# Patient Record
Sex: FEMALE | HISPANIC OR LATINO | Employment: PART TIME | URBAN - METROPOLITAN AREA
[De-identification: names, ages, dates, MRNs, and addresses within clinical notes are randomized per-mention and may not be internally consistent; named-entity substitution may affect disease eponyms.]

---

## 2019-06-06 ENCOUNTER — TELEPHONE (OUTPATIENT)
Dept: SCHEDULING | Facility: IMAGING CENTER | Age: 39
End: 2019-06-06

## 2019-07-26 ENCOUNTER — TELEPHONE (OUTPATIENT)
Dept: MEDICAL GROUP | Facility: LAB | Age: 39
End: 2019-07-26

## 2019-07-26 NOTE — TELEPHONE ENCOUNTER
NEW PATIENT VISIT PRE-VISIT PLANNING    1.  Epic[employer] Patient is checked in Patient Demographics? YES    2.  Immunizations were updated in Epic using WebIZ?: No WebIZ record           3.  Is this appointment scheduled as a Hospital Follow-Up? No    4.  Patient is due for the following Health Maintenance Topics:   Health Maintenance Due   Topic Date Due   • IMM DTaP/Tdap/Td Vaccine (1 - Tdap) 04/23/1998   • PAP SMEAR  04/23/2000   • MAMMOGRAM  04/23/2019           5. Orders for overdue Health Maintenance topics pended in Pre-Charting? N\A    6.  Reviewed/Updated the following with patient:   •   Preferred Pharmacy? NO       •   Preferred Lab? NO       •   Preferred Communication? NO       •   Allergies? NO       •   Medications? NO       •   Social History? NO       •   Family History (document living status of immediate family members and if + hx of cancer, diabetes, hypertension, hyperlipidemia, heart attack, stroke) NO    7.  Updated Care Team?       •   DME Company (gait device, O2, CPAP, etc.) NO       •   Other Specialists (eye doctor, derm, GYN, cardiology, endo, etc): NO    8.  Patient was informed to arrive 15 min prior to their   scheduled appointment and bring in their medication bottles.

## 2019-07-31 ENCOUNTER — HOSPITAL ENCOUNTER (OUTPATIENT)
Facility: MEDICAL CENTER | Age: 39
End: 2019-07-31
Attending: FAMILY MEDICINE
Payer: COMMERCIAL

## 2019-07-31 ENCOUNTER — OFFICE VISIT (OUTPATIENT)
Dept: MEDICAL GROUP | Facility: LAB | Age: 39
End: 2019-07-31
Payer: COMMERCIAL

## 2019-07-31 VITALS
RESPIRATION RATE: 12 BRPM | HEART RATE: 70 BPM | WEIGHT: 174 LBS | OXYGEN SATURATION: 96 % | BODY MASS INDEX: 28.99 KG/M2 | TEMPERATURE: 97.4 F | DIASTOLIC BLOOD PRESSURE: 62 MMHG | HEIGHT: 65 IN | SYSTOLIC BLOOD PRESSURE: 98 MMHG

## 2019-07-31 DIAGNOSIS — E78.5 DYSLIPIDEMIA: ICD-10-CM

## 2019-07-31 DIAGNOSIS — Z11.3 SCREENING FOR STD (SEXUALLY TRANSMITTED DISEASE): ICD-10-CM

## 2019-07-31 DIAGNOSIS — N93.0 BLEEDING AFTER INTERCOURSE: ICD-10-CM

## 2019-07-31 PROCEDURE — 87510 GARDNER VAG DNA DIR PROBE: CPT

## 2019-07-31 PROCEDURE — 87660 TRICHOMONAS VAGIN DIR PROBE: CPT

## 2019-07-31 PROCEDURE — 87480 CANDIDA DNA DIR PROBE: CPT

## 2019-07-31 PROCEDURE — 99214 OFFICE O/P EST MOD 30 MIN: CPT | Performed by: FAMILY MEDICINE

## 2019-07-31 RX ORDER — BISMUTH SUBCITRATE POTASSIUM, METRONIDAZOLE, TETRACYCLINE HYDROCHLORIDE 140; 125; 125 MG/1; MG/1; MG/1
CAPSULE ORAL
Refills: 0 | COMMUNITY
Start: 2019-06-01 | End: 2021-10-22

## 2019-07-31 ASSESSMENT — PATIENT HEALTH QUESTIONNAIRE - PHQ9: CLINICAL INTERPRETATION OF PHQ2 SCORE: 0

## 2019-07-31 NOTE — PROGRESS NOTES
Subjective:     CC: Est patient     HPI:   Valery presents today with    Bleeding after intercourse:  Patient states for the last 3 months she has had bleeding after intercourse.  She states it is new for her.  She denies any pain with intercourse.  She denies any discharge or foul orders.  She denies any history of STDs and any new exposures to STDs.  She denies any STDs for her partners.  She states she has no other abnormalities with the exception of spotting after intercourse.  She denies it being very rough or any changes with her intercourse pattern.    Dyslipidemia:  This is a chronic stable issue.  Patient has been evaluated for worsening lipid panels.  She states she tries to eat a good diet and exercise lifestyle.  She denies any chest pain or ischemia.  She would like a repeat lipid panel.    Past Medical History:   Diagnosis Date   • Chronic daily headache    • Dyslipidemia    • Elevated liver enzymes        Social History     Tobacco Use   • Smoking status: Never Smoker   • Smokeless tobacco: Never Used   Substance Use Topics   • Alcohol use: Yes     Comment: occasional   • Drug use: No       Current Outpatient Medications Ordered in Epic   Medication Sig Dispense Refill   • Multiple Vitamins-Minerals (MULTIVITAMIN ADULT PO) Take  by mouth.     • vitamin D, Ergocalciferol, (DRISDOL) 05075 UNIT CAPS capsule Take 1 Cap by mouth every 72 hours. 1 tab 2x/week 24 Cap 3   • lorazepam (ATIVAN) 0.5 MG TABS Take 0.5 mg by mouth 3 times a day as needed.     • PYLERA 140-125-125 MG per capsule TK 1 C PO QID  0   • ibuprofen (MOTRIN) 800 MG TABS Take 1 Tab by mouth every 8 hours as needed. (Patient not taking: Reported on 7/30/2019) 90 Each 1   • citalopram (CELEXA) 20 MG TABS Take 1 Tab by mouth every morning. (Patient not taking: Reported on 7/30/2019) 30 Each 1     No current [facility]-ordered facility-administered medications on file.        Allergies:  Omnicef    ROS:  Gen: no fevers/chill, no changes in  "weight  Eyes: no changes in vision  ENT: no sore throat, no hearing loss, no bloody nose  Pulm: no sob, no cough  CV: no chest pain, no palpitations  GI: no nausea/vomiting, no diarrhea  : no dysuria  MSk: no myalgias  Skin: no rash  Neuro: no headaches, no numbness/tingling  Heme/Lymph: no easy bruising      Objective:       Exam:  BP (!) 98/62 (BP Location: Left arm, Patient Position: Sitting, BP Cuff Size: Adult)   Pulse 70   Temp 36.3 °C (97.4 °F) (Temporal)   Resp 12   Ht 1.638 m (5' 4.5\")   Wt 78.9 kg (174 lb)   LMP 07/17/2019   SpO2 96%   BMI 29.41 kg/m²  Body mass index is 29.41 kg/m².    Gen: Alert and oriented, No apparent distress.  Neck: Neck is supple without lymphadenopathy.  Lungs: Normal effort, CTA bilaterally, no wheezes, rhonchi, or rales  CV: Regular rate and rhythm. No murmurs, rubs, or gallops.               Ext: No clubbing, cyanosis, edema.    Assessment & Plan:     39 y.o. female with the following -     1. Dyslipidemia  Continue active lifestyle and good diet and exercise.  Follow-up labs.  - HEMOGLOBIN A1C; Future  - CBC WITH DIFFERENTIAL; Future  - Comp Metabolic Panel; Future  - Lipid Profile; Future  - TSH WITH REFLEX TO FT4; Future    2. Screening for STD (sexually transmitted disease)  Ordered today  - T.PALLIDUM AB EIA; Future  - HIV AG/AB COMBO ASSAY SCREENING; Future  - Chlamydia/GC PCR Urine Or Swab; Future  - VAGINAL PATHOGENS DNA PANEL; Future    3. Bleeding after intercourse  Will evaluate for infective process, STD panel as well as BD affirm.  Possibly benign however given her age spotting and irregularity could be concerning.  If continues will consider pelvic exam and ultrasound.  Follow-up labs.  She will be following up for a Pap smear.        Please note that this dictation was created using voice recognition software. I have made every reasonable attempt to correct obvious errors, but I expect that there are errors of grammar and possibly content that I did not " discover before finalizing the note.

## 2019-07-31 NOTE — LETTER
[facility]  Graciela Jaramillo M.D.  [address]  [place] [address]  Fax: [phone]   Authorization for Release/Disclosure of   Protected Health Information   Name: VALERY MELARA : 1980 SSN: [id number]   Address: [address] [place] [address] [place] [address] Phone:    [phone] (home)    I authorize the entity listed below to release/disclose the PHI below to:   [facility]/Graciela Jaramillo M.D. and Graciela Jaramillo M.D.   Provider or Entity Name:  [facility]   Address   City, [facility], [facility]   Phone:  [phone]    Fax:     Reason for request: continuity of care   Information to be released:    [  ] LAST COLONOSCOPY,  including any PATH REPORT and follow-up  [  ] LAST FIT/COLOGUARD RESULT [  ] LAST DEXA  [  ] LAST MAMMOGRAM  [ X ] LAST PAP  [  ] LAST LABS [  ] RETINA EXAM REPORT  [  ] IMMUNIZATION RECORDS  [  ] Release all info      [  ] Check here and initial the line next to each item to release ALL health information INCLUDING  _____ Care and treatment for drug and / or alcohol abuse  _____ HIV testing, infection status, or AIDS  _____ Genetic Testing    DATES OF SERVICE OR TIME PERIOD TO BE DISCLOSED: _____________  I understand and acknowledge that:  * This Authorization may be revoked at any time by you in writing, except if your health information has already been used or disclosed.  * Your health information that will be used or disclosed as a result of you signing this authorization could be re-disclosed by the recipient. If this occurs, your re-disclosed health information may no longer be protected by State or Federal laws.  * You may refuse to sign this Authorization. Your refusal will not affect your ability to obtain treatment.  * This Authorization becomes effective upon signing and will  on (date) __________.      If no date is indicated, this Authorization will  one (1) year from the signature date.    Name: Valery Melara    Signature:   Date:     2019            PLEASE FAX REQUESTED RECORDS BACK TO: [phone]

## 2019-08-01 LAB
AMBIGUOUS DTTM AMBI4: NORMAL
CANDIDA DNA VAG QL PROBE+SIG AMP: NEGATIVE
G VAGINALIS DNA VAG QL PROBE+SIG AMP: NEGATIVE
SIGNIFICANT IND 70042: NORMAL
SITE SITE: NORMAL
SOURCE SOURCE: NORMAL
T VAGINALIS DNA VAG QL PROBE+SIG AMP: NEGATIVE

## 2019-10-10 ENCOUNTER — OFFICE VISIT (OUTPATIENT)
Dept: MEDICAL GROUP | Facility: LAB | Age: 39
End: 2019-10-10
Payer: COMMERCIAL

## 2019-10-10 ENCOUNTER — HOSPITAL ENCOUNTER (OUTPATIENT)
Facility: MEDICAL CENTER | Age: 39
End: 2019-10-10
Attending: FAMILY MEDICINE
Payer: COMMERCIAL

## 2019-10-10 VITALS
SYSTOLIC BLOOD PRESSURE: 100 MMHG | TEMPERATURE: 97.6 F | OXYGEN SATURATION: 97 % | BODY MASS INDEX: 29.46 KG/M2 | HEART RATE: 87 BPM | HEIGHT: 65 IN | WEIGHT: 176.8 LBS | DIASTOLIC BLOOD PRESSURE: 60 MMHG

## 2019-10-10 DIAGNOSIS — Z20.828 HERPES EXPOSURE: Primary | ICD-10-CM

## 2019-10-10 DIAGNOSIS — Z01.419 WELL WOMAN EXAM WITH ROUTINE GYNECOLOGICAL EXAM: ICD-10-CM

## 2019-10-10 DIAGNOSIS — N93.9 VAGINAL BLEEDING: ICD-10-CM

## 2019-10-10 DIAGNOSIS — Z12.39 BREAST CANCER SCREENING: ICD-10-CM

## 2019-10-10 PROCEDURE — 99396 PREV VISIT EST AGE 40-64: CPT | Performed by: FAMILY MEDICINE

## 2019-10-10 PROCEDURE — 87624 HPV HI-RISK TYP POOLED RSLT: CPT

## 2019-10-10 PROCEDURE — 88175 CYTOPATH C/V AUTO FLUID REDO: CPT

## 2019-10-10 NOTE — PROGRESS NOTES
Subjective:     CC:   Chief Complaint   Patient presents with   • Gynecologic Exam       HPI:   Valery Haney is a 39 y.o. female who presents for annual exam. She is feeling well and denies any complaints.    Patient has GYN provider: no  Last pap: 3 years ago, wnl  Last mammo: Due  Last colonoscopy: Age 49  Last bone density test: Age 64  Qualify for hep C screen: No  Last Tdap: Done  Gardiasil: Aged out  Hx. STD's: No  Birth control: No    Menses every month with 28-31 days moderate bleeding previously, now ever 3 weeks  Cramping is moderate.   She do not take OTC analgesics for cramps.  No significant bloating/fluid retention, pelvic pain, or dyspareunia. No vaginal discharge, BLEEDING WITH INTERCOURSE AND BURNING   No breast tenderness, mass, nipple discharge, changes in size or contour, or abnormal cyclic discomfort.  She does perform regular self breast exams.  Regular exercise: no   Diet: balanced diet     She  has a past medical history of Chronic daily headache, Dyslipidemia, and Elevated liver enzymes.  She  has a past surgical history that includes other and other.    Family History   Problem Relation Age of Onset   • Cancer Mother         stomach   • Diabetes Mother    • Hypertension Mother    • Diabetes Father    • Hypertension Father    • Hyperlipidemia Father    • Other Brother         seizure   • Hypertension Brother        Social History     Socioeconomic History   • Marital status:      Spouse name: Not on file   • Number of children: Not on file   • Years of education: Not on file   • Highest education level: Not on file   Occupational History   • Not on file   Social Needs   • Financial resource strain: Not on file   • Food insecurity:     Worry: Not on file     Inability: Not on file   • Transportation needs:     Medical: Not on file     Non-medical: Not on file   Tobacco Use   • Smoking status: Never Smoker   • Smokeless tobacco: Never Used   Substance and Sexual Activity   • Alcohol  use: Yes     Comment: occasional   • Drug use: No   • Sexual activity: Yes     Partners: Male   Lifestyle   • Physical activity:     Days per week: Not on file     Minutes per session: Not on file   • Stress: Not on file   Relationships   • Social connections:     Talks on phone: Not on file     Gets together: Not on file     Attends [employer] service: Not on file     Active member of club or organization: Not on file     Attends meetings of clubs or organizations: Not on file     Relationship status: Not on file   • Intimate partner violence:     Fear of current or ex partner: Not on file     Emotionally abused: Not on file     Physically abused: Not on file     Forced sexual activity: Not on file   Other Topics Concern   • Not on file   Social History Narrative   • Not on file       Patient Active Problem List    Diagnosis Date Noted   • Vitamin d deficiency 10/21/2011   • Chronic daily headache    • Dyslipidemia    • Elevated liver enzymes          Current Outpatient Medications   Medication Sig Dispense Refill   • Multiple Vitamins-Minerals (MULTIVITAMIN ADULT PO) Take  by mouth.     • vitamin D, Ergocalciferol, (DRISDOL) 18590 UNIT CAPS capsule Take 1 Cap by mouth every 72 hours. 1 tab 2x/week 24 Cap 3   • PYLERA 140-125-125 MG per capsule TK 1 C PO QID  0   • ibuprofen (MOTRIN) 800 MG TABS Take 1 Tab by mouth every 8 hours as needed. (Patient not taking: Reported on 7/30/2019) 90 Each 1   • lorazepam (ATIVAN) 0.5 MG TABS Take 0.5 mg by mouth 3 times a day as needed.     • citalopram (CELEXA) 20 MG TABS Take 1 Tab by mouth every morning. (Patient not taking: Reported on 7/30/2019) 30 Each 1     No current facility-administered medications for this visit.      Allergies   Allergen Reactions   • Omnicef Swelling     Swelling of lips and hands       Review of Systems   Constitutional: Negative for fever, chills and malaise/fatigue.   HENT: Negative for congestion.    Eyes: Negative for pain.   Respiratory:  "Negative for cough and shortness of breath.    Cardiovascular: Negative for leg swelling.   Gastrointestinal: Negative for nausea, vomiting, abdominal pain and diarrhea.   Genitourinary: Negative for dysuria and hematuria.   Skin: Negative for rash.   Neurological: Negative for dizziness, focal weakness and headaches.   Endo/Heme/Allergies: Does not bruise/bleed easily.   Psychiatric/Behavioral: Negative for depression.  The patient is not nervous/anxious.      Objective:     /60 (BP Location: Left arm, Patient Position: Sitting)   Pulse 87   Temp 36.4 °C (97.6 °F) (Temporal)   Ht 1.638 m (5' 4.5\")   Wt 80.2 kg (176 lb 12.8 oz)   SpO2 97%   BMI 29.88 kg/m²   Body mass index is 29.88 kg/m².  Wt Readings from Last 4 Encounters:   10/10/19 80.2 kg (176 lb 12.8 oz)   07/31/19 78.9 kg (174 lb)   10/21/11 75.8 kg (167 lb)   03/24/11 77.1 kg (170 lb)       Physical Exam:  Constitutional: Well-developed and well-nourished. Not diaphoretic. No distress.   Skin: Skin is warm and dry. No rash noted.  Head: Atraumatic without lesions.  Eyes: Conjunctivae and extraocular motions are normal. Pupils are equal, round, and reactive to light. No scleral icterus.   Ears:  External ears unremarkable. Tympanic membranes clear and intact.  Nose: Nares patent. Septum midline. Turbinates without erythema nor edema. No discharge.   Mouth/Throat: Dentition is good. Tongue normal. Oropharynx is clear and moist. Posterior pharynx without erythema or exudates.  Neck: Supple, trachea midline. Normal range of motion. No thyromegaly present. No lymphadenopathy--cervical or supraclavicular.  Cardiovascular: Regular rate and rhythm, S1 and S2 without murmur, rubs, or gallops.    Breast: Breasts examined seated and supine. No skin changes, peau d'orange or nipple retraction. No discharge. No axillary or supraclavicular adenopathy. No masses or nodularity palpable.   Abdomen: Soft, non tender, and without distention. Active bowel sounds in " all four quadrants. No rebound, guarding, masses or HSM.  :Perineum and external genitalia normal without rash. Vagina with normal and physiologic discharge. Cervix without visible lesions or discharge. Bimanual exam without adnexal masses or cervical motion tenderness.  Extremities: No cyanosis, clubbing, erythema, nor edema. Distal pulses intact and symmetric.   Musculoskeletal: All major joints AROM full in all directions without pain.  Neurological: Alert and oriented x 3  Psychiatric:  Behavior, mood, and affect are appropriate.    Assessment and Plan:     1. Well woman exam with routine gynecological exam  Normal physical exam, due for mammogram, up-to-date on vaccines.  - THINPREP PAP WITH HPV; Future    2. Breast cancer screening  Ordered today  - [id number]; Future    3. Herpes exposure  Would like herpes a screening as her  has a diagnosis of herpes 2  - HSV 1/2 IGG W/ TYPE SPECIFIC RFLX; Future    4. Vaginal bleeding  Patient has had a history of bleeding after intercourse.  She has noticed it now for greater than 3 months on last visit we did do a test for BD affirm, this was negative.  She did not complete the STD panel or other labs at this time.  Now she has had some irregularity in her periods.  I think given she is not quite perimenopausal we should do a pelvic ultrasound.  She denies any traumatic sexual experiences.  She states that the bleeding after intercourse is spotting and it is mucousy and bright red.  She denies any pain only burning.  Follow-up ultrasound and STD panel.  - US-PELVIC TRANSVAGINAL ONLY; Future      Labs per orders  Immunizations per orders  Patient counseled about skin care, diet, supplements, vitamins, safe sex and exercise.    Follow-up: Return for 20 Min.    Please note that this dictation was created using voice recognition software. I have made every reasonable attempt to correct obvious errors, but I expect that there are errors of grammar and  possibly content that I did not discover before finalizing the note.

## 2019-10-11 DIAGNOSIS — Z01.419 WELL WOMAN EXAM WITH ROUTINE GYNECOLOGICAL EXAM: ICD-10-CM

## 2019-10-11 LAB
CYTOLOGY REG CYTOL: NORMAL
HPV HR 12 DNA CVX QL NAA+PROBE: NEGATIVE
HPV16 DNA SPEC QL NAA+PROBE: NEGATIVE
HPV18 DNA SPEC QL NAA+PROBE: NEGATIVE
SPECIMEN SOURCE: NORMAL

## 2019-11-09 LAB — HBA1C MFR BLD: 5.3 % (ref 0–5.6)

## 2019-12-09 ENCOUNTER — OFFICE VISIT (OUTPATIENT)
Dept: MEDICAL GROUP | Facility: LAB | Age: 39
End: 2019-12-09
Payer: COMMERCIAL

## 2019-12-09 VITALS
WEIGHT: 179.6 LBS | DIASTOLIC BLOOD PRESSURE: 60 MMHG | BODY MASS INDEX: 29.92 KG/M2 | HEART RATE: 70 BPM | OXYGEN SATURATION: 97 % | TEMPERATURE: 97.6 F | HEIGHT: 65 IN | SYSTOLIC BLOOD PRESSURE: 108 MMHG

## 2019-12-09 DIAGNOSIS — R74.8 ELEVATED LIVER ENZYMES: ICD-10-CM

## 2019-12-09 DIAGNOSIS — N93.9 VAGINAL BLEEDING: ICD-10-CM

## 2019-12-09 DIAGNOSIS — E78.5 DYSLIPIDEMIA: ICD-10-CM

## 2019-12-09 PROCEDURE — 99214 OFFICE O/P EST MOD 30 MIN: CPT | Performed by: FAMILY MEDICINE

## 2019-12-09 NOTE — PROGRESS NOTES
Subjective:     CC: Follow-up labs    HPI:   Valery presents today with     Dyslipidemia:  This is a chronic unstable issue.  Patient has a history of dyslipidemia and she states from her previous labs that her lipid panel has improved.  She however has gained weight.  She denies any chest pains or signs of ischemia.    Elevated liver enzymes:  This is a chronic unstable issue.  Patient has a history of elevated liver enzymes.  She is not a heavy drinker or on any liver toxic medications.  This is happened in the past and she states that it is worsening.  She denies any right upper quadrant pain.  She would not like any further work-up at this time as she is struggling with money currently.    Vaginal bleeding:  This is a chronic unstable issue.  Patient complained of vaginal bleeding after intercourse.  BD affirm and STD testing negative.  Pelvic ultrasound showed a stable fibroid.  Offered GYN interventions however patient declined.    Past Medical History:   Diagnosis Date   • Chronic daily headache    • Dyslipidemia    • Elevated liver enzymes        Social History     Tobacco Use   • Smoking status: Never Smoker   • Smokeless tobacco: Never Used   Substance Use Topics   • Alcohol use: Yes     Comment: occasional   • Drug use: No       Current Outpatient Medications Ordered in Epic   Medication Sig Dispense Refill   • PYLERA 140-125-125 MG per capsule TK 1 C PO QID  0   • Multiple Vitamins-Minerals (MULTIVITAMIN ADULT PO) Take  by mouth.     • vitamin D, Ergocalciferol, (DRISDOL) 23007 UNIT CAPS capsule Take 1 Cap by mouth every 72 hours. 1 tab 2x/week 24 Cap 3   • lorazepam (ATIVAN) 0.5 MG TABS Take 0.5 mg by mouth 3 times a day as needed.     • ibuprofen (MOTRIN) 800 MG TABS Take 1 Tab by mouth every 8 hours as needed. (Patient not taking: Reported on 7/30/2019) 90 Each 1   • citalopram (CELEXA) 20 MG TABS Take 1 Tab by mouth every morning. (Patient not taking: Reported on 7/30/2019) 30 Each 1     No  "current [facility]-ordered facility-administered medications on file.        Allergies:  Omnicef      ROS:  Gen: no fevers/chill, no changes in weight  Eyes: no changes in vision  ENT: no sore throat, no hearing loss, no bloody nose  Pulm: no sob, no cough  CV: no chest pain, no palpitations  GI: no nausea/vomiting, no diarrhea  : no dysuria  MSk: no myalgias  Skin: no rash  Neuro: no headaches, no numbness/tingling  Heme/Lymph: no easy bruising      Objective:       Exam:  /60 (BP Location: Left arm, Patient Position: Sitting)   Pulse 70   Temp 36.4 °C (97.6 °F) (Temporal)   Ht 1.638 m (5' 4.5\")   Wt 81.5 kg (179 lb 9.6 oz)   SpO2 97%   BMI 30.35 kg/m²  Body mass index is 30.35 kg/m².    Gen: Alert and oriented, No apparent distress.  Neck: Neck is supple without lymphadenopathy.  Lungs: Normal effort, CTA bilaterally, no wheezes, rhonchi, or rales  CV: Regular rate and rhythm. No murmurs, rubs, or gallops.               Ext: No clubbing, cyanosis, edema.    Assessment & Plan:     39 y.o. female with the following -     1. Dyslipidemia  Would like to do lifestyle intervention to improve on her cholesterol panel.  ASCVD risk low.  Will continue monitor.  - Comp Metabolic Panel; Future  - Lipid Profile; Future    2. Elevated liver enzymes  Patient declines further work-up as this is her second labs done with liver enzyme elevation.  Will return after 3 months of lifestyle intervention.  - Comp Metabolic Panel; Future  - Lipid Profile; Future    3.  Vaginal bleeding  Patient declined gyn referral  for possible ablation, will think about it and contact us.    Please note that this dictation was created using voice recognition software. I have made every reasonable attempt to correct obvious errors, but I expect that there are errors of grammar and possibly content that I did not discover before finalizing the note.      "

## 2020-12-16 ENCOUNTER — NON-PROVIDER VISIT (OUTPATIENT)
Dept: MEDICAL GROUP | Facility: LAB | Age: 40
End: 2020-12-16
Payer: COMMERCIAL

## 2020-12-16 DIAGNOSIS — Z23 NEED FOR VACCINATION: ICD-10-CM

## 2020-12-16 PROCEDURE — 90686 IIV4 VACC NO PRSV 0.5 ML IM: CPT | Performed by: INTERNAL MEDICINE

## 2020-12-16 PROCEDURE — 90471 IMMUNIZATION ADMIN: CPT | Performed by: INTERNAL MEDICINE

## 2020-12-16 NOTE — PROGRESS NOTES
"Valery Haney is a 40 y.o. female here for a non-provider visit for:   FLU    Reason for immunization: Annual Flu Vaccine  Immunization records indicate need for vaccine: Yes, confirmed with Epic  Minimum interval has been met for this vaccine: Yes  ABN completed: Yes    Order and dose verified by: ar  VIS Dated   was given to patient: Yes  All IAC Questionnaire questions were answered \"No.\"    Patient tolerated injection and no adverse effects were observed or reported: Yes    Pt scheduled for next dose in series: Not Indicated   "

## 2020-12-17 ENCOUNTER — TELEPHONE (OUTPATIENT)
Dept: MEDICAL GROUP | Facility: LAB | Age: 40
End: 2020-12-17

## 2020-12-18 NOTE — TELEPHONE ENCOUNTER
Patient called and is requesting a copy of her Immunization record. Called and LM for patient to call back.

## 2021-01-25 ENCOUNTER — APPOINTMENT (OUTPATIENT)
Dept: MEDICAL GROUP | Facility: LAB | Age: 41
End: 2021-01-25
Payer: COMMERCIAL

## 2021-10-22 ENCOUNTER — OFFICE VISIT (OUTPATIENT)
Dept: MEDICAL GROUP | Facility: PHYSICIAN GROUP | Age: 41
End: 2021-10-22
Payer: COMMERCIAL

## 2021-10-22 ENCOUNTER — HOSPITAL ENCOUNTER (OUTPATIENT)
Dept: LAB | Facility: MEDICAL CENTER | Age: 41
End: 2021-10-22
Attending: FAMILY MEDICINE
Payer: COMMERCIAL

## 2021-10-22 ENCOUNTER — TELEPHONE (OUTPATIENT)
Dept: MEDICAL GROUP | Facility: PHYSICIAN GROUP | Age: 41
End: 2021-10-22

## 2021-10-22 VITALS
HEIGHT: 64 IN | SYSTOLIC BLOOD PRESSURE: 110 MMHG | OXYGEN SATURATION: 94 % | WEIGHT: 183 LBS | DIASTOLIC BLOOD PRESSURE: 72 MMHG | BODY MASS INDEX: 31.24 KG/M2 | HEART RATE: 67 BPM | TEMPERATURE: 96.9 F

## 2021-10-22 DIAGNOSIS — Z00.00 WELL WOMAN EXAM (NO GYNECOLOGICAL EXAM): ICD-10-CM

## 2021-10-22 DIAGNOSIS — R10.11 RUQ PAIN: ICD-10-CM

## 2021-10-22 LAB
ALBUMIN SERPL BCP-MCNC: 4.5 G/DL (ref 3.2–4.9)
ALBUMIN/GLOB SERPL: 1.6 G/DL
ALP SERPL-CCNC: 81 U/L (ref 30–99)
ALT SERPL-CCNC: 74 U/L (ref 2–50)
ANION GAP SERPL CALC-SCNC: 11 MMOL/L (ref 7–16)
AST SERPL-CCNC: 44 U/L (ref 12–45)
BILIRUB SERPL-MCNC: 0.4 MG/DL (ref 0.1–1.5)
BUN SERPL-MCNC: 10 MG/DL (ref 8–22)
CALCIUM SERPL-MCNC: 9.5 MG/DL (ref 8.5–10.5)
CHLORIDE SERPL-SCNC: 105 MMOL/L (ref 96–112)
CHOLEST SERPL-MCNC: 268 MG/DL (ref 100–199)
CO2 SERPL-SCNC: 23 MMOL/L (ref 20–33)
CREAT SERPL-MCNC: 0.77 MG/DL (ref 0.5–1.4)
ERYTHROCYTE [DISTWIDTH] IN BLOOD BY AUTOMATED COUNT: 44.8 FL (ref 35.9–50)
EST. AVERAGE GLUCOSE BLD GHB EST-MCNC: 108 MG/DL
FASTING STATUS PATIENT QL REPORTED: NORMAL
GLOBULIN SER CALC-MCNC: 2.8 G/DL (ref 1.9–3.5)
GLUCOSE SERPL-MCNC: 96 MG/DL (ref 65–99)
HBA1C MFR BLD: 5.4 % (ref 4–5.6)
HCT VFR BLD AUTO: 43.4 % (ref 37–47)
HDLC SERPL-MCNC: 54 MG/DL
HGB BLD-MCNC: 14.8 G/DL (ref 12–16)
LDLC SERPL CALC-MCNC: 173 MG/DL
MCH RBC QN AUTO: 31 PG (ref 27–33)
MCHC RBC AUTO-ENTMCNC: 34.1 G/DL (ref 33.6–35)
MCV RBC AUTO: 90.8 FL (ref 81.4–97.8)
PLATELET # BLD AUTO: 311 K/UL (ref 164–446)
PMV BLD AUTO: 10.9 FL (ref 9–12.9)
POTASSIUM SERPL-SCNC: 4.3 MMOL/L (ref 3.6–5.5)
PROT SERPL-MCNC: 7.3 G/DL (ref 6–8.2)
RBC # BLD AUTO: 4.78 M/UL (ref 4.2–5.4)
SODIUM SERPL-SCNC: 139 MMOL/L (ref 135–145)
TRIGL SERPL-MCNC: 206 MG/DL (ref 0–149)
WBC # BLD AUTO: 6.3 K/UL (ref 4.8–10.8)

## 2021-10-22 PROCEDURE — 80053 COMPREHEN METABOLIC PANEL: CPT

## 2021-10-22 PROCEDURE — 85027 COMPLETE CBC AUTOMATED: CPT

## 2021-10-22 PROCEDURE — 36415 COLL VENOUS BLD VENIPUNCTURE: CPT

## 2021-10-22 PROCEDURE — 99214 OFFICE O/P EST MOD 30 MIN: CPT | Performed by: FAMILY MEDICINE

## 2021-10-22 PROCEDURE — 80061 LIPID PANEL: CPT

## 2021-10-22 PROCEDURE — 83036 HEMOGLOBIN GLYCOSYLATED A1C: CPT

## 2021-10-22 NOTE — PROGRESS NOTES
"Subjective:     Chief Complaint   Patient presents with   • Establish Care   • GI Problem     RLQ, discomfort, x10mo       HPI:   Valery presents today to discuss the following.    RUQ pain  Chronic issue  Has had RUQ pain over the past 10 mo  Usually within 10min of eating  Maybe after eating something greasy could make it worse  No hx of abd surgeries   She does have a hx of fatty liver      Past Medical History:   Diagnosis Date   • Chronic daily headache    • Dyslipidemia    • Elevated liver enzymes        No current Epic-ordered outpatient medications on file.     No current [facility]-ordered facility-administered medications on file.       Allergies:  Omnicef    Health Maintenance: Completed    ROS:  Gen: no fevers/chills, no changes in weight  Eyes: no changes in vision  Pulm: no sob, no cough  CV: no chest pain, no palpitations  GI: no nausea/vomiting, no diarrhea      Objective:     Exam:  /72 (BP Location: Right arm, Patient Position: Sitting, BP Cuff Size: Adult)   Pulse 67   Temp 36.1 °C (96.9 °F) (Temporal)   Ht 1.626 m (5' 4\")   Wt 83 kg (183 lb)   SpO2 94%   BMI 31.41 kg/m²  Body mass index is 31.41 kg/m².        Constitutional: Alert, no distress, well-groomed.  Skin: Warm, dry, good turgor, no rashes in visible areas.  Eye: Equal, round and reactive, conjunctiva clear, lids normal.  ENMT: Lips without lesions, good dentition, moist mucous membranes.  Neck: Trachea midline, no masses, no thyromegaly.  Respiratory: Unlabored respiratory effort, no cough.  ABD: Right upper quadrant tenderness to palpation.  There is no Wilburn sign present.  MSK: Normal gait, moves all extremities.  Neuro: Grossly non-focal.   Psych: Alert and oriented x3, normal affect and mood.        Assessment & Plan:     41 y.o. female with the following -     1. RUQ pain  This is a chronic condition.  The patient has had persistent right upper quadrant pain over the past 10 months especially after eating meals.  Physical " exam is concerning for gallbladder etiology.  I would like to establish baseline labs today along with an ultrasound of the right upper quadrant for further assessment and evaluation.  - Comp Metabolic Panel; Future  - US-RUQ; Future    2. Well woman exam (no gynecological exam)  - CBC WITHOUT DIFFERENTIAL; Future  - HEMOGLOBIN A1C; Future  - Lipid Profile; Future      Return in about 3 months (around 1/22/2022).    Please note that this dictation was created using voice recognition software. I have made every reasonable attempt to correct obvious errors, but I expect that there are errors of grammar and possibly content that I did not discover before finalizing the note.

## 2021-10-22 NOTE — ASSESSMENT & PLAN NOTE
Chronic issue  Has had RUQ pain over the past 10 mo  Usually within 10min of eating  Maybe after eating something greasy could make it worse  No hx of abd surgeries   She does have a hx of fatty liver

## 2021-10-23 NOTE — TELEPHONE ENCOUNTER
----- Message from Chico Martinez M.D. sent at 10/22/2021  2:30 PM PDT -----  Let patient know in Croatian  Does not have prediabetes or diabetes  No anemia  Please make sure that she was fasting during this test.  Cholesterol values are elevated  One of her liver enzymes is mildly elevated as well.  Again ensure that she was fasting.  We may have to repeat this test

## 2021-10-23 NOTE — TELEPHONE ENCOUNTER
Phone Number Called: [phone]    Call outcome: Spoke to patient regarding message below.    Message: Spoke to the patient regarding the following results.  Does not have prediabetes or diabetes  No anemia  Please make sure that she was fasting during this test.  Cholesterol values are elevated  One of her liver enzymes is mildly elevated as well.  Again ensure that she was fasting.  We may have to repeat this test    Patient acknowledged, and states she was fasting at the time she did her labs.    Please Advise.

## 2022-08-08 ENCOUNTER — OFFICE VISIT (OUTPATIENT)
Dept: MEDICAL GROUP | Facility: MEDICAL CENTER | Age: 42
End: 2022-08-08
Payer: COMMERCIAL

## 2022-08-08 VITALS
DIASTOLIC BLOOD PRESSURE: 74 MMHG | OXYGEN SATURATION: 99 % | BODY MASS INDEX: 28.98 KG/M2 | HEIGHT: 64 IN | SYSTOLIC BLOOD PRESSURE: 124 MMHG | RESPIRATION RATE: 16 BRPM | TEMPERATURE: 97.3 F | HEART RATE: 95 BPM | WEIGHT: 169.75 LBS

## 2022-08-08 DIAGNOSIS — Z13.220 LIPID SCREENING: ICD-10-CM

## 2022-08-08 DIAGNOSIS — Z83.3 FAMILY HISTORY OF DIABETES MELLITUS: ICD-10-CM

## 2022-08-08 DIAGNOSIS — Z12.31 ENCOUNTER FOR SCREENING MAMMOGRAM FOR BREAST CANCER: ICD-10-CM

## 2022-08-08 DIAGNOSIS — R10.9 STOMACH PAIN: ICD-10-CM

## 2022-08-08 DIAGNOSIS — F32.A ANXIETY AND DEPRESSION: ICD-10-CM

## 2022-08-08 DIAGNOSIS — R74.8 ELEVATED LIVER ENZYMES: ICD-10-CM

## 2022-08-08 DIAGNOSIS — K92.1 BLOOD IN THE STOOL: ICD-10-CM

## 2022-08-08 DIAGNOSIS — Z00.00 PREVENTATIVE HEALTH CARE: ICD-10-CM

## 2022-08-08 DIAGNOSIS — F41.9 ANXIETY AND DEPRESSION: ICD-10-CM

## 2022-08-08 DIAGNOSIS — Z13.29 THYROID DISORDER SCREEN: ICD-10-CM

## 2022-08-08 PROCEDURE — 99204 OFFICE O/P NEW MOD 45 MIN: CPT | Performed by: INTERNAL MEDICINE

## 2022-08-08 RX ORDER — VENLAFAXINE HYDROCHLORIDE 37.5 MG/1
37.5 CAPSULE, EXTENDED RELEASE ORAL DAILY
Qty: 30 CAPSULE | Refills: 3 | Status: SHIPPED | OUTPATIENT
Start: 2022-08-08 | End: 2023-06-07

## 2022-08-08 RX ORDER — HYDROXYZINE HYDROCHLORIDE 25 MG/1
25 TABLET, FILM COATED ORAL 3 TIMES DAILY PRN
Qty: 30 TABLET | Refills: 0 | Status: SHIPPED | OUTPATIENT
Start: 2022-08-08 | End: 2023-06-07

## 2022-08-08 ASSESSMENT — ANXIETY QUESTIONNAIRES
4. TROUBLE RELAXING: NEARLY EVERY DAY
3. WORRYING TOO MUCH ABOUT DIFFERENT THINGS: NEARLY EVERY DAY
2. NOT BEING ABLE TO STOP OR CONTROL WORRYING: NEARLY EVERY DAY
7. FEELING AFRAID AS IF SOMETHING AWFUL MIGHT HAPPEN: SEVERAL DAYS
5. BEING SO RESTLESS THAT IT IS HARD TO SIT STILL: NEARLY EVERY DAY
1. FEELING NERVOUS, ANXIOUS, OR ON EDGE: NEARLY EVERY DAY
IF YOU CHECKED OFF ANY PROBLEMS ON THIS QUESTIONNAIRE, HOW DIFFICULT HAVE THESE PROBLEMS MADE IT FOR YOU TO DO YOUR WORK, TAKE CARE OF THINGS AT HOME, OR GET ALONG WITH OTHER PEOPLE: VERY DIFFICULT
GAD7 TOTAL SCORE: 19
6. BECOMING EASILY ANNOYED OR IRRITABLE: NEARLY EVERY DAY

## 2022-08-08 ASSESSMENT — ENCOUNTER SYMPTOMS
FEVER: 0
CHILLS: 0
COUGH: 0
ABDOMINAL PAIN: 1
NAUSEA: 1
SHORTNESS OF BREATH: 0
DEPRESSION: 1
SORE THROAT: 0
NERVOUS/ANXIOUS: 1
INSOMNIA: 1

## 2022-08-08 ASSESSMENT — FIBROSIS 4 INDEX: FIB4 SCORE: 0.71

## 2022-08-08 ASSESSMENT — PATIENT HEALTH QUESTIONNAIRE - PHQ9
SUM OF ALL RESPONSES TO PHQ QUESTIONS 1-9: 24
CLINICAL INTERPRETATION OF PHQ2 SCORE: 5
5. POOR APPETITE OR OVEREATING: 3 - NEARLY EVERY DAY

## 2022-08-08 NOTE — ASSESSMENT & PLAN NOTE
This is an ongoing problem, patient is working with psychotherapy, which is somewhat helpful.  She is interested in pharmacotherapy.   - Start venlafaxine XR 37.5 mg tablet daily   - We will titrate up as needed.   -Hydroxyzine as needed for anxiety and sleep   - Continue psychotherapy   - Consider referral to behavioral health.

## 2022-08-08 NOTE — PATIENT INSTRUCTIONS
National Suicide Prevention Hotline: 1-831.228.3322 or just fernando 911.      Crisis Text Line: Text Hello to 911305

## 2022-08-08 NOTE — ASSESSMENT & PLAN NOTE
Patient has  had elevated ALT levels in October 2021.  Has not been followed by repeat CMP or right upper quadrant ultrasound since then.  She was evaluated by GI consultants ongoing stomach issues in the past.    - Repeat CMP   - Obtain right upper quadrant ultrasound

## 2022-08-08 NOTE — LETTER
[facility]  Juanita Steiner M.D.  [address]  [place] [address]  Fax: [phone]   Authorization for Release/Disclosure of   Protected Health Information   Name: MALORIE YANEZ : 1980 SSN: [id number]   Address: [address] [place] [address] Phone:    [phone] (home)    I authorize the entity listed below to release/disclose the PHI below to:   [facility]/Juanita Steiner M.D. and Juanita Steiner M.D.   Provider or Entity Name:  [facility]   Address   City, State, [facility]   Phone:      Fax:     Reason for request: continuity of care   Information to be released:    [  ] LAST COLONOSCOPY,  including any PATH REPORT and follow-up  [  ] LAST FIT/COLOGUARD RESULT [  ] LAST DEXA  [  ] LAST MAMMOGRAM  [  ] LAST PAP  [  ] LAST LABS [  ] RETINA EXAM REPORT  [  ] IMMUNIZATION RECORDS  [ x ] Release all info      [  ] Check here and initial the line next to each item to release ALL health information INCLUDING  _____ Care and treatment for drug and / or alcohol abuse  _____ HIV testing, infection status, or AIDS  _____ Genetic Testing    DATES OF SERVICE OR TIME PERIOD TO BE DISCLOSED: _____________  I understand and acknowledge that:  * This Authorization may be revoked at any time by you in writing, except if your health information has already been used or disclosed.  * Your health information that will be used or disclosed as a result of you signing this authorization could be re-disclosed by the recipient. If this occurs, your re-disclosed health information may no longer be protected by State or Federal laws.  * You may refuse to sign this Authorization. Your refusal will not affect your ability to obtain treatment.  * This Authorization becomes effective upon signing and will  on (date) __________.      If no date is indicated, this Authorization will  one (1) year from the signature date.    Name: Malorie Haney  Gianna    Signature:   Date:     8/8/2022       PLEASE FAX REQUESTED RECORDS BACK TO: [phone]

## 2022-08-08 NOTE — ASSESSMENT & PLAN NOTE
This is a chronic problem, patient was evaluated in the past by gastroenterology with upper endoscopy and H. pylori testing.  At that time results were negative.  She reports ongoing abdominal stomach discomfort, requesting H. pylori testing.    - Obtain H. pylori stool test   - Refer to gastroenterology    - At the end of appointment, she mentions that she has been seeing blood after wiping on the toilet paper

## 2022-08-08 NOTE — PROGRESS NOTES
Subjective:     Chief Complaint   Patient presents with   • Establish Care   • Depression   • Anxiety   • Requesting Labs      Diagnoses of Anxiety and depression, Elevated liver enzymes, Stomach pain, Preventative health care, Family history of diabetes mellitus, Lipid screening, Thyroid disorder screen, Encounter for screening mammogram for breast cancer, and Blood in the stool were pertinent to this visit.    HISTORY OF THE PRESENT ILLNESS: Patient is a 42 y.o. female. This pleasant patient is here today to establish care. Her prior PCP was Chico Martinez    Problem   Anxiety and Depression    This is an ongoing problem, patient is unfortunately going through divorce with her .  She is doing psychotherapy which she finds helpful.  PHQ-9 Screening 8/8/2022 7/31/2019   Little interest or pleasure in doing things 2 - more than half the days 0 - not at all   Feeling down, depressed, or hopeless 3 - nearly every day 0 - not at all   Trouble falling or staying asleep, or sleeping too much 3 - nearly every day -   Feeling tired or having little energy 3 - nearly every day -   Poor appetite or overeating 3 - nearly every day -   Feeling bad about yourself - or that you are a failure or have let yourself or your family down 3 - nearly every day -   Trouble concentrating on things, such as reading the newspaper or watching television 3 - nearly every day -   Moving or speaking so slowly that other people could have noticed. Or the opposite - being so fidgety or restless that you have been moving around a lot more than usual 3 - nearly every day -   Thoughts that you would be better off dead, or of hurting yourself in some way 1 - several days -   PHQ-2 Total Score 5 0   PHQ-9 Total Score 24 -       Interpretation of PHQ-9 Total Score   Score Severity   1-4 No Depression   5-9 Mild Depression   10-14 Moderate Depression   15-19 Moderately Severe Depression   20-27 Severe Depression    She did answer positively on  question about self-harm, however she denies active plan.  She is aware of suicide prevention.  She was treated medically if she develops an active plan.     Stomach Pain    This is a ongoing problem, patient reported diffuse abdominal pain.  She was evaluated by gastroenterology for this issue, she has undergone upper endoscopy and was tested for H. pylori.  She does report some nausea, associated with abdominal discomfort and is interested in H. pylori testing.     Elevated Liver Enzymes    Patient history of elevated liver enzymes, she was seen by her prior PCP in October and was referred for RUQ  ultrasound, which has not been completed.  She does have diffuse abdominal discomfort, however no clear right upper quadrant pain.  Lab Results   Component Value Date/Time    SODIUM 139 10/22/2021 09:29 AM    POTASSIUM 4.3 10/22/2021 09:29 AM    CHLORIDE 105 10/22/2021 09:29 AM    CO2 23 10/22/2021 09:29 AM    ANION 11.0 10/22/2021 09:29 AM    GLUCOSE 96 10/22/2021 09:29 AM    BUN 10 10/22/2021 09:29 AM    CREATININE 0.77 10/22/2021 09:29 AM    CREATININE 0.77 02/09/2011 12:00 AM    CALCIUM 9.5 10/22/2021 09:29 AM    ASTSGOT 44 10/22/2021 09:29 AM    ALTSGPT 74 (H) 10/22/2021 09:29 AM    TBILIRUBIN 0.4 10/22/2021 09:29 AM    ALBUMIN 4.5 10/22/2021 09:29 AM    TOTPROTEIN 7.3 10/22/2021 09:29 AM    GLOBULIN 2.8 10/22/2021 09:29 AM    AGRATIO 1.6 10/22/2021 09:29 AM            Past Medical History:   Diagnosis Date   • Chronic daily headache    • Dyslipidemia    • Elevated liver enzymes      Current Outpatient Medications Ordered in Epic   Medication Sig Dispense Refill   • venlafaxine XR (EFFEXOR XR) 37.5 MG CAPSULE SR 24 HR Take 1 Capsule by mouth every day. 30 Capsule 3   • hydrOXYzine HCl (ATARAX) 25 MG Tab Take 1 Tablet by mouth 3 times a day as needed for Anxiety. 30 Tablet 0     No current Epic-ordered facility-administered medications on file.     Health Maintenance: Patient never has had a mammogram, placed an  "order.  Patient to schedule.    Review of Systems   Constitutional: Negative for chills and fever.   HENT: Negative for sore throat.    Respiratory: Negative for cough and shortness of breath.    Cardiovascular: Negative for chest pain.   Gastrointestinal: Positive for abdominal pain and nausea. Negative for melena.   Psychiatric/Behavioral: Positive for depression. Negative for suicidal ideas. The patient is nervous/anxious and has insomnia.      Objective:     Exam: /74 (BP Location: Right arm, Patient Position: Sitting, BP Cuff Size: Adult)   Pulse 95   Temp 36.3 °C (97.3 °F) (Temporal)   Resp 16   Ht 1.626 m (5' 4\")   Wt 77 kg (169 lb 12.1 oz)   SpO2 99%  Body mass index is 29.14 kg/m².    Physical Exam  HENT:      Head: Normocephalic and atraumatic.      Nose: Nose normal.      Mouth/Throat:      Mouth: Mucous membranes are moist.   Cardiovascular:      Rate and Rhythm: Normal rate and regular rhythm.      Pulses: Normal pulses.      Heart sounds: Normal heart sounds.   Pulmonary:      Effort: Pulmonary effort is normal.      Breath sounds: Normal breath sounds.   Abdominal:      General: Bowel sounds are normal.      Palpations: Abdomen is soft.      Tenderness: There is generalized abdominal tenderness. There is no guarding or rebound.      Hernia: No hernia is present.   Neurological:      Mental Status: She is alert.       Labs: Reviewed results of CBC, CMP, Lipid panel from 10/22/2021.    Assessment & Plan:   42 y.o. female with the following -    Problem List Items Addressed This Visit     Anxiety and depression     This is an ongoing problem, patient is working with psychotherapy, which is somewhat helpful.  She is interested in pharmacotherapy.   - Start venlafaxine XR 37.5 mg tablet daily   - We will titrate up as needed.   -Hydroxyzine as needed for anxiety and sleep   - Continue psychotherapy   - Consider referral to behavioral health.           Relevant Medications    venlafaxine XR " (EFFEXOR XR) 37.5 MG CAPSULE SR 24 HR    hydrOXYzine HCl (ATARAX) 25 MG Tab    Elevated liver enzymes     Patient has  had elevated ALT levels in October 2021.  Has not been followed by repeat CMP or right upper quadrant ultrasound since then.  She was evaluated by [facility] consultants ongoing stomach issues in the past.    - Repeat CMP   - Obtain right upper quadrant ultrasound           Relevant Orders    US-RUQ    Comp Metabolic Panel    Stomach pain     This is a chronic problem, patient was evaluated in the past by gastroenterology with upper endoscopy and H. pylori testing.  At that time results were negative.  She reports ongoing abdominal stomach discomfort, requesting H. pylori testing.    - Obtain H. pylori stool test   - Refer to gastroenterology    - At the end of appointment, she mentions that she has been seeing blood after wiping on the toilet paper           Relevant Orders    H.PYLORI STOOL ANTIGEN      Other Visit Diagnoses     Preventative health care        Relevant Orders    CBC WITH DIFFERENTIAL    Family history of diabetes mellitus        Relevant Orders    HEMOGLOBIN A1C    Lipid screening        Relevant Orders    Lipid Profile    Thyroid disorder screen        Relevant Orders    TSH WITH REFLEX TO FT4    Encounter for screening mammogram for breast cancer        Relevant Orders    MA-SCREENING MAMMO BILAT W/CAD    Blood in the stool        Relevant Orders    Referral to Gastroenterology          Return in about 5 weeks (around 9/12/2022), or if symptoms worsen or fail to improve.    Please note that this dictation was created using voice recognition software. I have made every reasonable attempt to correct obvious errors, but I expect that there are errors of grammar and possibly content that I did not discover before finalizing the note.

## 2022-08-11 ENCOUNTER — HOSPITAL ENCOUNTER (OUTPATIENT)
Dept: RADIOLOGY | Facility: MEDICAL CENTER | Age: 42
End: 2022-08-11
Attending: INTERNAL MEDICINE
Payer: COMMERCIAL

## 2022-08-11 DIAGNOSIS — Z12.31 ENCOUNTER FOR SCREENING MAMMOGRAM FOR BREAST CANCER: ICD-10-CM

## 2022-08-11 PROCEDURE — 77063 BREAST TOMOSYNTHESIS BI: CPT

## 2022-08-15 ENCOUNTER — HOSPITAL ENCOUNTER (OUTPATIENT)
Dept: RADIOLOGY | Facility: MEDICAL CENTER | Age: 42
End: 2022-08-15
Attending: INTERNAL MEDICINE
Payer: COMMERCIAL

## 2022-08-15 DIAGNOSIS — R74.8 ELEVATED LIVER ENZYMES: ICD-10-CM

## 2022-08-15 PROCEDURE — 76705 ECHO EXAM OF ABDOMEN: CPT

## 2022-08-22 ENCOUNTER — HOSPITAL ENCOUNTER (OUTPATIENT)
Dept: LAB | Facility: MEDICAL CENTER | Age: 42
End: 2022-08-22
Attending: INTERNAL MEDICINE
Payer: COMMERCIAL

## 2022-08-22 DIAGNOSIS — R74.8 ELEVATED LIVER ENZYMES: ICD-10-CM

## 2022-08-22 DIAGNOSIS — Z00.00 PREVENTATIVE HEALTH CARE: ICD-10-CM

## 2022-08-22 DIAGNOSIS — Z86.39 HISTORY OF IRON DEFICIENCY: ICD-10-CM

## 2022-08-22 DIAGNOSIS — Z13.220 LIPID SCREENING: ICD-10-CM

## 2022-08-22 DIAGNOSIS — Z83.3 FAMILY HISTORY OF DIABETES MELLITUS: ICD-10-CM

## 2022-08-22 DIAGNOSIS — Z13.29 THYROID DISORDER SCREEN: ICD-10-CM

## 2022-08-22 LAB
ALBUMIN SERPL BCP-MCNC: 4.5 G/DL (ref 3.2–4.9)
ALBUMIN/GLOB SERPL: 1.6 G/DL
ALP SERPL-CCNC: 67 U/L (ref 30–99)
ALT SERPL-CCNC: 65 U/L (ref 2–50)
ANION GAP SERPL CALC-SCNC: 13 MMOL/L (ref 7–16)
AST SERPL-CCNC: 46 U/L (ref 12–45)
BASOPHILS # BLD AUTO: 0.2 % (ref 0–1.8)
BASOPHILS # BLD: 0.02 K/UL (ref 0–0.12)
BILIRUB SERPL-MCNC: 0.4 MG/DL (ref 0.1–1.5)
BUN SERPL-MCNC: 10 MG/DL (ref 8–22)
CALCIUM SERPL-MCNC: 9.1 MG/DL (ref 8.5–10.5)
CHLORIDE SERPL-SCNC: 104 MMOL/L (ref 96–112)
CHOLEST SERPL-MCNC: 236 MG/DL (ref 100–199)
CO2 SERPL-SCNC: 22 MMOL/L (ref 20–33)
CREAT SERPL-MCNC: 0.7 MG/DL (ref 0.5–1.4)
EOSINOPHIL # BLD AUTO: 0.06 K/UL (ref 0–0.51)
EOSINOPHIL NFR BLD: 0.7 % (ref 0–6.9)
ERYTHROCYTE [DISTWIDTH] IN BLOOD BY AUTOMATED COUNT: 42.6 FL (ref 35.9–50)
EST. AVERAGE GLUCOSE BLD GHB EST-MCNC: 108 MG/DL
FASTING STATUS PATIENT QL REPORTED: NORMAL
GFR SERPLBLD CREATININE-BSD FMLA CKD-EPI: 110 ML/MIN/1.73 M 2
GLOBULIN SER CALC-MCNC: 2.9 G/DL (ref 1.9–3.5)
GLUCOSE SERPL-MCNC: 108 MG/DL (ref 65–99)
HBA1C MFR BLD: 5.4 % (ref 4–5.6)
HCT VFR BLD AUTO: 38.2 % (ref 37–47)
HDLC SERPL-MCNC: 53 MG/DL
HGB BLD-MCNC: 12.2 G/DL (ref 12–16)
IMM GRANULOCYTES # BLD AUTO: 0.05 K/UL (ref 0–0.11)
IMM GRANULOCYTES NFR BLD AUTO: 0.6 % (ref 0–0.9)
IRON SATN MFR SERPL: 6 % (ref 15–55)
IRON SERPL-MCNC: 28 UG/DL (ref 40–170)
LDLC SERPL CALC-MCNC: 150 MG/DL
LYMPHOCYTES # BLD AUTO: 1.79 K/UL (ref 1–4.8)
LYMPHOCYTES NFR BLD: 19.9 % (ref 22–41)
MCH RBC QN AUTO: 27.7 PG (ref 27–33)
MCHC RBC AUTO-ENTMCNC: 31.9 G/DL (ref 33.6–35)
MCV RBC AUTO: 86.8 FL (ref 81.4–97.8)
MONOCYTES # BLD AUTO: 0.29 K/UL (ref 0–0.85)
MONOCYTES NFR BLD AUTO: 3.2 % (ref 0–13.4)
NEUTROPHILS # BLD AUTO: 6.77 K/UL (ref 2–7.15)
NEUTROPHILS NFR BLD: 75.4 % (ref 44–72)
NRBC # BLD AUTO: 0 K/UL
NRBC BLD-RTO: 0 /100 WBC
PLATELET # BLD AUTO: 365 K/UL (ref 164–446)
PMV BLD AUTO: 11.3 FL (ref 9–12.9)
POTASSIUM SERPL-SCNC: 3.8 MMOL/L (ref 3.6–5.5)
PROT SERPL-MCNC: 7.4 G/DL (ref 6–8.2)
RBC # BLD AUTO: 4.4 M/UL (ref 4.2–5.4)
SODIUM SERPL-SCNC: 139 MMOL/L (ref 135–145)
TIBC SERPL-MCNC: 475 UG/DL (ref 250–450)
TRIGL SERPL-MCNC: 166 MG/DL (ref 0–149)
TSH SERPL DL<=0.005 MIU/L-ACNC: 1.01 UIU/ML (ref 0.38–5.33)
UIBC SERPL-MCNC: 447 UG/DL (ref 110–370)
WBC # BLD AUTO: 9 K/UL (ref 4.8–10.8)

## 2022-08-22 PROCEDURE — 83550 IRON BINDING TEST: CPT

## 2022-08-22 PROCEDURE — 36415 COLL VENOUS BLD VENIPUNCTURE: CPT

## 2022-08-22 PROCEDURE — 85025 COMPLETE CBC W/AUTO DIFF WBC: CPT

## 2022-08-22 PROCEDURE — 83540 ASSAY OF IRON: CPT

## 2022-08-22 PROCEDURE — 80053 COMPREHEN METABOLIC PANEL: CPT

## 2022-08-22 PROCEDURE — 83036 HEMOGLOBIN GLYCOSYLATED A1C: CPT

## 2022-08-22 PROCEDURE — 84443 ASSAY THYROID STIM HORMONE: CPT

## 2022-08-22 PROCEDURE — 80061 LIPID PANEL: CPT

## 2022-08-26 ENCOUNTER — HOSPITAL ENCOUNTER (OUTPATIENT)
Facility: MEDICAL CENTER | Age: 42
End: 2022-08-26
Attending: INTERNAL MEDICINE
Payer: COMMERCIAL

## 2022-08-26 DIAGNOSIS — R10.9 STOMACH PAIN: ICD-10-CM

## 2022-08-26 LAB — H PYLORI AG STL QL IA: NOT DETECTED

## 2022-08-26 PROCEDURE — 87338 HPYLORI STOOL AG IA: CPT

## 2022-09-08 ENCOUNTER — OFFICE VISIT (OUTPATIENT)
Dept: MEDICAL GROUP | Facility: MEDICAL CENTER | Age: 42
End: 2022-09-08
Payer: COMMERCIAL

## 2022-09-08 VITALS — WEIGHT: 168.65 LBS | BODY MASS INDEX: 28.95 KG/M2

## 2022-09-08 DIAGNOSIS — E78.2 MIXED HYPERLIPIDEMIA: ICD-10-CM

## 2022-09-08 DIAGNOSIS — E78.5 HYPERLIPIDEMIA, UNSPECIFIED HYPERLIPIDEMIA TYPE: ICD-10-CM

## 2022-09-08 DIAGNOSIS — E61.1 IRON DEFICIENCY: ICD-10-CM

## 2022-09-08 DIAGNOSIS — D25.9 UTERINE LEIOMYOMA, UNSPECIFIED LOCATION: ICD-10-CM

## 2022-09-08 PROCEDURE — 99214 OFFICE O/P EST MOD 30 MIN: CPT | Performed by: INTERNAL MEDICINE

## 2022-09-08 RX ORDER — PRAVASTATIN SODIUM 20 MG
20 TABLET ORAL NIGHTLY
Qty: 30 TABLET | Refills: 11 | Status: SHIPPED | OUTPATIENT
Start: 2022-09-08 | End: 2023-06-07

## 2022-09-08 ASSESSMENT — ENCOUNTER SYMPTOMS
CHILLS: 0
SHORTNESS OF BREATH: 0
FEVER: 0
COUGH: 0
SORE THROAT: 0

## 2022-09-08 ASSESSMENT — FIBROSIS 4 INDEX: FIB4 SCORE: 0.67

## 2022-09-08 NOTE — ASSESSMENT & PLAN NOTE
Patient presents requesting referral to interventional radiology for uterine artery embolization.  Unfortunately, no records from her gynecology at this time.    - Request records and imaging from endocrinology   - Patient will attempt to upload results of the ultrasound via [employer]   - Refer to interventional radiology.

## 2022-09-08 NOTE — ASSESSMENT & PLAN NOTE
Patient is hesitant to take statin at this time, however she accepted prescription for pravastatin.   Advised on fish oil supplementation, healthful lifestyle measures, diet, exercise.    - Repeat lipid panel in 3 months.

## 2022-09-08 NOTE — LETTER
[facility]  Juanita Steiner M.D.  [address]  [place] [address]  Fax: [phone]   Authorization for Release/Disclosure of   Protected Health Information   Name: MARCE YANEZ : 1980 SSN: [id number]   Address: [address] [place] [address] Phone:    [phone] (home)    I authorize the entity listed below to release/disclose the PHI below to:   [facility]/Juanita Steiner M.D. and Juanita Steiner M.D.   Provider or Entity Name:  Dr. Minh Herrera  ([facility])   Address   City, State, [facility]   Phone:      Fax:     Reason for request: continuity of care   Information to be released:    [  ] LAST COLONOSCOPY,  including any PATH REPORT and follow-up  [  ] LAST FIT/COLOGUARD RESULT [  ] LAST DEXA  [  ] LAST MAMMOGRAM  [  ] LAST PAP  [  ] LAST LABS [  ] RETINA EXAM REPORT  [  ] IMMUNIZATION RECORDS  [x  ] Release all info      [  ] Check here and initial the line next to each item to release ALL health information INCLUDING  _____ Care and treatment for drug and / or alcohol abuse  _____ HIV testing, infection status, or AIDS  _____ Genetic Testing    DATES OF SERVICE OR TIME PERIOD TO BE DISCLOSED: _____________  I understand and acknowledge that:  * This Authorization may be revoked at any time by you in writing, except if your health information has already been used or disclosed.  * Your health information that will be used or disclosed as a result of you signing this authorization could be re-disclosed by the recipient. If this occurs, your re-disclosed health information may no longer be protected by State or Federal laws.  * You may refuse to sign this Authorization. Your refusal will not affect your ability to obtain treatment.  * This Authorization becomes effective upon signing and will  on (date) __________.      If no date is indicated, this Authorization will  one (1) year from the signature  date.    Name: Malorie Katz    Signature:   Date:     9/8/2022       PLEASE FAX REQUESTED RECORDS BACK TO: [phone]

## 2022-09-08 NOTE — PROGRESS NOTES
Subjective:     Diagnoses of Uterine leiomyoma, unspecified location, Hyperlipidemia, unspecified hyperlipidemia type, Iron deficiency, and Mixed hyperlipidemia were pertinent to this visit.    HPI: Malorie Rasmussen is a pleasant 42 y.o. female who presents today for follow up on Labs     Problem   Uterine Fibroid    This is a longstanding problem, I do not have any records at this time from her OB/GYN.  Per patient, she was advised by her OB/GYN to be referred to interventional radiology for uterine artery embolization.  Not sure, why for gynecologist was unable to refer her.      Iron Deficiency    Component      Latest Ref Rng & Units 8/22/2022          10:30 AM   Iron      40 - 170 ug/dL 28 (L)   Total Iron Binding      250 - 450 ug/dL 475 (H)   Unsat Iron Binding      110 - 370 ug/dL 447 (H)   % Saturation      15 - 55 % 6 (L)   Hemoglobin was normal limits, patient has longstanding history of fibroids and awaiting procedure.     Mixed Hyperlipidemia    This is a chronic problem, total cholesterol and LDL did improve after weight loss, however still significantly above normal limits.  In the light of fatty liver disease, cholesterol control is very important and this was emphasized to the patient.  Lab Results   Component Value Date/Time    CHOLSTRLTOT 236 (H) 08/22/2022 10:30 AM    TRIGLYCERIDE 166 (H) 08/22/2022 10:30 AM    HDL 53 08/22/2022 10:30 AM     (H) 08/22/2022 10:30 AM             Past Medical History:   Diagnosis Date    Chronic daily headache     Dyslipidemia     Elevated liver enzymes      Review of Systems   Constitutional:  Negative for chills and fever.   HENT:  Negative for sore throat.    Respiratory:  Negative for cough and shortness of breath.    Cardiovascular:  Negative for chest pain.   Psychiatric/Behavioral:  Negative for suicidal ideas.      Objective:     Exam:  BP (P) 106/64 (BP Location: Left arm, Patient Position: Sitting, BP Cuff Size: Adult)   Pulse (P) 71   Temp (P)  "36.6 °C (97.8 °F) (Temporal)   Ht (P) 1.626 m (5' 4\")   Wt 76.5 kg (168 lb 10.4 oz)   SpO2 (P) 100%   BMI (P) 28.95 kg/m²  Body mass index is 28.95 kg/m² (pended).    Physical Exam  Constitutional:       Appearance: Normal appearance.   HENT:      Head: Normocephalic and atraumatic.   Pulmonary:      Effort: Pulmonary effort is normal.   Neurological:      Mental Status: She is alert and oriented to person, place, and time.   Psychiatric:         Mood and Affect: Mood normal.     Labs: Reviewed and discussed results of CBC, CMP, hemoglobin A1c, lipid panel TSH from 8/22/2022    Assessment & Plan:   Malorie Rasmussen  is a pleasant 42 y.o. female with the following -     Problem List Items Addressed This Visit       Mixed hyperlipidemia (Chronic)     Patient is hesitant to take statin at this time, however she accepted prescription for pravastatin.   Advised on fish oil supplementation, healthful lifestyle measures, diet, exercise.    - Repeat lipid panel in 3 months.         Relevant Medications    pravastatin (PRAVACHOL) 20 MG Tab    Uterine fibroid (Chronic)     Patient presents requesting referral to interventional radiology for uterine artery embolization.  Unfortunately, no records from her gynecology at this time.    - Request records and imaging from endocrinology   - Patient will attempt to upload results of the ultrasound via [employer]   - Refer to interventional radiology.         Relevant Orders    Referral to Interventional Radiology    Iron deficiency     Advised on iron rich foods, ferrous sulfate 325 mg once daily with vitamin C or orange juice.          Other Visit Diagnoses       Hyperlipidemia, unspecified hyperlipidemia type        Relevant Medications    pravastatin (PRAVACHOL) 20 MG Tab    Other Relevant Orders    Lipid Profile          Return in about 3 months (around 12/8/2022), or if symptoms worsen or fail to improve, for lipidp panel .    Please note that this dictation was created using " voice recognition software. I have made every reasonable attempt to correct obvious errors, but I expect that there are errors of grammar and possibly content that I did not discover before finalizing the note.

## 2022-09-23 ENCOUNTER — HOSPITAL ENCOUNTER (OUTPATIENT)
Facility: MEDICAL CENTER | Age: 42
End: 2022-09-23
Attending: OBSTETRICS & GYNECOLOGY | Admitting: OBSTETRICS & GYNECOLOGY
Payer: COMMERCIAL

## 2022-09-29 ENCOUNTER — TELEPHONE (OUTPATIENT)
Dept: MEDICAL GROUP | Facility: MEDICAL CENTER | Age: 42
End: 2022-09-29
Payer: COMMERCIAL

## 2022-09-29 NOTE — TELEPHONE ENCOUNTER
"----- Message from Juanita Steiner M.D. sent at 9/27/2022 12:35 PM PDT -----  Regarding: Referral for uterine artery embolization  Janel Shah,     Please call and let patient know, that I have not heard back from interventional radiology.    \"Authorizations reached out to me about this Referral to Interventional Radiology for uterine artery embolization. Prior to scheduling this consult the patient will need to have an MRI Pelvis w-w/o and an Endometrial biopsy with in the  past 3 months. I don't see any current imaging on file. Will she be following up with a OB/GYN provider in the area? The  OB/GYN provider usually perform these biopsies.\"    Therefore I recommend her to follow-up with her OB/GYN specialist and they will have to order the tests listed above and they will have to refer her to an interventional radiologist when all these tests are completed.            "

## 2022-09-29 NOTE — TELEPHONE ENCOUNTER
Phone Number Called: [phone] (home)      Call outcome: Spoke to patient regarding message below.    Message: contacted pt informed of provider note. Pt verbalized understanding and had no questions

## 2022-11-01 ENCOUNTER — APPOINTMENT (OUTPATIENT)
Dept: RADIOLOGY | Facility: MEDICAL CENTER | Age: 42
End: 2022-11-01
Attending: OBSTETRICS & GYNECOLOGY
Payer: COMMERCIAL

## 2022-11-12 ENCOUNTER — HOSPITAL ENCOUNTER (OUTPATIENT)
Dept: RADIOLOGY | Facility: MEDICAL CENTER | Age: 42
End: 2022-11-12
Attending: OBSTETRICS & GYNECOLOGY
Payer: COMMERCIAL

## 2022-11-12 DIAGNOSIS — D25.9 UTERINE LEIOMYOMA, UNSPECIFIED LOCATION: ICD-10-CM

## 2022-11-12 PROCEDURE — A9576 INJ PROHANCE MULTIPACK: HCPCS

## 2022-11-12 PROCEDURE — 72197 MRI PELVIS W/O & W/DYE: CPT

## 2022-11-12 PROCEDURE — 700117 HCHG RX CONTRAST REV CODE 255

## 2022-11-12 RX ADMIN — GADOTERIDOL 15 ML: 279.3 INJECTION, SOLUTION INTRAVENOUS at 18:44

## 2022-11-28 NOTE — PROGRESS NOTES
Interventional Radiology Consultation      Re: Malorie Katz     MRN: [id number]   : 1980    Malorie Katz was referred by Juanita Steiner MD. She is a 42 y.o. female seen in clinic for evaluation and possible intervention of painful uterine fibroids. She is also under the care of Jojo Rodríguez MD.    History of Present Illness:  Ms.Ruiz Katz presents with painful uterine fibroids. She was first diagnosed about 4-5 years ago but was told they were small and no intervention was planned. Now, the dominant fibroid has grown to about 8 cm in size. She has a history of heavy menses for greater than a year. She has low back pain. She has pressure and cramping in her pelvis. She had a depo provera injection last month that has helped. Ms.Ruiz Katz is apprehensive about undergoing a hysterectomy and has been referred to interventional radiology for evaluation and management with locoregional therapy. Today, she reports menorrhagia, low back pain, and pelvic pain. She is not currently sexually active but reports dyspareunia. She is not looking at future family planning. She lives in [place] and is unaccompanied by a support person to today's consultation.    She is seen today for review of imaging studies and discussion of possible endovascular intervention for painful uterine fibroids.    Past Medical History:   Diagnosis Date    Chronic daily headache     Dyslipidemia     Elevated liver enzymes      Past Surgical History:   Procedure Laterality Date    ABDOMINOPLASTY  2014    OTHER      LASIK both eyes    OTHER      in grown toenail      Social History     Socioeconomic History    Marital status:      Spouse name: Not on file    Number of children: Not on file    Years of education: Not on file    Highest education level: Not on file   Occupational History    Not on file   Tobacco Use    Smoking status: Never    Smokeless tobacco: Never   Vaping Use    Vaping Use:  Never used   Substance and Sexual Activity    Alcohol use: Yes     Comment: Socially    Drug use: No    Sexual activity: Yes     Partners: Male   Other Topics Concern    Not on file   Social History Narrative    Not on file     Social Determinants of Health     Financial Resource Strain: Not on file   Food Insecurity: Not on file   Transportation Needs: Not on file   Physical Activity: Not on file   Stress: Not on file   Social Connections: Not on file   Intimate Partner Violence: Not on file   Housing Stability: Not on file     Family History   Problem Relation Age of Onset    Cancer Mother         stomach    Diabetes Mother     Hypertension Mother     Diabetes Father     Hypertension Father     Hyperlipidemia Father     Other Brother         seizure    Hypertension Brother        Review of Systems   Constitutional: Negative.  Negative for chills, diaphoresis, fever, malaise/fatigue and weight loss.   Respiratory: Negative.     Cardiovascular: Negative.    Gastrointestinal: Negative.    Genitourinary:  Positive for frequency.        Positive for heavy menses     Musculoskeletal:  Positive for back pain.   Skin: Negative.    Neurological:  Negative for sensory change, speech change, focal weakness and weakness.   Psychiatric/Behavioral:  Negative for substance abuse.      A comprehensive 14-point review of systems was negative except as described above.     Labs:    Latest Reference Range & Units 08/22/22 10:30   WBC 4.8 - 10.8 K/uL 9.0   RBC 4.20 - 5.40 M/uL 4.40   Hemoglobin 12.0 - 16.0 g/dL 12.2   Hematocrit 37.0 - 47.0 % 38.2   MCV 81.4 - 97.8 fL 86.8   MCH 27.0 - 33.0 pg 27.7   MCHC 33.6 - 35.0 g/dL 31.9 (L)   RDW 35.9 - 50.0 fL 42.6   Platelet Count 164 - 446 K/uL 365   MPV 9.0 - 12.9 fL 11.3   Neutrophils-Polys 44.00 - 72.00 % 75.40 (H)   Neutrophils (Absolute) 2.00 - 7.15 K/uL 6.77   Lymphocytes 22.00 - 41.00 % 19.90 (L)   Lymphs (Absolute) 1.00 - 4.80 K/uL 1.79   Monocytes 0.00 - 13.40 % 3.20   Monos  (Absolute) 0.00 - 0.85 K/uL 0.29   Eosinophils 0.00 - 6.90 % 0.70   Eos (Absolute) 0.00 - 0.51 K/uL 0.06   Basophils 0.00 - 1.80 % 0.20   Baso (Absolute) 0.00 - 0.12 K/uL 0.02   Immature Granulocytes 0.00 - 0.90 % 0.60   Immature Granulocytes (abs) 0.00 - 0.11 K/uL 0.05   Nucleated RBC /100 WBC 0.00   NRBC (Absolute) K/uL 0.00      [facility] Reference Range & Units 08/22/22 10:30   Sodium 135 - 145 mmol/L 139   Potassium 3.6 - 5.5 mmol/L 3.8   Chloride 96 - 112 mmol/L 104   Co2 20 - 33 mmol/L 22   Anion Gap 7.0 - 16.0  13.0   Glucose 65 - 99 mg/dL 108 (H)   Bun 8 - 22 mg/dL 10   Creatinine 0.50 - 1.40 mg/dL 0.70   GFR (CKD-EPI) >60 mL/min/1.73 m 2 110   Calcium 8.5 - 10.5 mg/dL 9.1   AST(SGOT) 12 - 45 U/L 46 (H)   ALT(SGPT) 2 - 50 U/L 65 (H)   Alkaline Phosphatase 30 - 99 U/L 67   Total Bilirubin 0.1 - 1.5 mg/dL 0.4   Albumin 3.2 - 4.9 g/dL 4.5   Total Protein 6.0 - 8.2 g/dL 7.4   Globulin 1.9 - 3.5 g/dL 2.9   A-G Ratio g/dL 1.6   Iron 40 - 170 ug/dL 28 (L)   Total Iron Binding 250 - 450 ug/dL 475 (H)   % Saturation 15 - 55 % 6 (L)   Unsat Iron Binding 110 - 370 ug/dL 447 (H)   Glycohemoglobin 4.0 - 5.6 % 5.4   Estim. Avg Glu mg/dL 108   Fasting Status  Fasting   Cholesterol,Tot 100 - 199 mg/dL 236 (H)   Triglycerides 0 - 149 mg/dL 166 (H)   HDL >=40 mg/dL 53   LDL <100 mg/dL 150 (H)   (H): Data is abnormally high  (L): Data is abnormally low    Pathology:  8/18/22:      Radiology:   MRI pelvis on 11/12/22 at [facility]:  1.  Probable small submucosal fibroid anteriorly on the RIGHT in the lower uterine segment.  2.  Large fibroid at the dorsal fundus measuring 8.3 cm.  3.  Smaller fibroids in the ventral fundus measuring up to 12 mm.      Current Outpatient Medications   Medication Sig Dispense Refill    pravastatin (PRAVACHOL) 20 MG Tab Take 1 Tablet by mouth every evening. 30 Tablet 11    venlafaxine XR (EFFEXOR XR) 37.5 MG CAPSULE SR 24 HR Take 1 Capsule by mouth every day. 30 Capsule 3    hydrOXYzine HCl (ATARAX) 25  MG Tab Take 1 Tablet by mouth 3 times a day as needed for Anxiety. 30 Tablet 0     No current facility-administered medications for this visit.       Allergies   Allergen Reactions    Omnicef Swelling     Swelling of lips and hands       Physical Exam  Constitutional:       General: She is not in acute distress.     Appearance: She is not diaphoretic.   HENT:      Head: Normocephalic.   Eyes:      General: No scleral icterus.  Pulmonary:      Effort: Pulmonary effort is normal. No respiratory distress.   Abdominal:      General: There is no distension.   Skin:     General: Skin is warm and dry.      Coloration: Skin is not pale.      Findings: No erythema or rash.   Neurological:      General: No focal deficit present.      Mental Status: She is alert and oriented to person, place, and time. She is not disoriented.      Cranial Nerves: No cranial nerve deficit or facial asymmetry.      Sensory: Sensation is intact.      Motor: No weakness or tremor.      Coordination: Coordination normal.      Gait: Gait is intact. Gait normal.   Psychiatric:         Mood and Affect: Mood and affect normal.         Behavior: Behavior normal.         Thought Content: Thought content normal.         Cognition and Memory: Memory normal.         Judgment: Judgment normal.     ECOG Performance Status 0    Impression:   1. Leiomyoma.   2. Abnormal uterine bleeding.  3. Hyperlipidemia.    Plan:   Dain Corona MD has reviewed 's history and imaging studies, examined the patient, and discussed treatment options.  is a candidate for transarterial embolization of painful uterine fibroids. We discussed the method of the procedure at length including angiography and embolization as well as the expected clinical course with the probability of post-embolization syndrome nausea and pain and hospitalization. We discussed the possibility of fibroid recurrence and development of future tumors. We additionally discussed the risks,  including bleeding and infection, damage to the arteries or adjacent tissue, reaction to any medications given during the procedure, potential side effects of contrast administration including renal damage, non-target embolization, premature ovarian failure, and death. There is a risk the procedure will not be effective in managing her symptoms to her satisfaction, specifically her bulk symptoms that include pelvic pressure and urinary frequency. We discussed alternatives to the procedure including surveillance with no intervention and she has already discussed medication management and myomectomy/ hysterectomy with her gynecologist. The patient verbalizes understanding of risks, benefits, and alternatives to IR intervention and will let us know if she elects to proceed with [place]. All questions were answered.     VAL Santana with Dain Corona MD  Interventional Radiology   [facility]   [id number]  [place] [address]  [phone]

## 2022-11-29 ENCOUNTER — HOSPITAL ENCOUNTER (OUTPATIENT)
Dept: RADIOLOGY | Facility: MEDICAL CENTER | Age: 42
End: 2022-11-29
Attending: INTERNAL MEDICINE
Payer: COMMERCIAL

## 2022-11-29 DIAGNOSIS — D25.9 UTERINE LEIOMYOMA, UNSPECIFIED LOCATION: ICD-10-CM

## 2022-11-29 ASSESSMENT — ENCOUNTER SYMPTOMS
GASTROINTESTINAL NEGATIVE: 1
CONSTITUTIONAL NEGATIVE: 1
SPEECH CHANGE: 0
FOCAL WEAKNESS: 0
DIAPHORESIS: 0
WEIGHT LOSS: 0
SENSORY CHANGE: 0
FEVER: 0
BACK PAIN: 1
RESPIRATORY NEGATIVE: 1
CHILLS: 0
WEAKNESS: 0
CARDIOVASCULAR NEGATIVE: 1

## 2022-11-29 ASSESSMENT — LIFESTYLE VARIABLES: SUBSTANCE_ABUSE: 0

## 2022-11-30 ENCOUNTER — APPOINTMENT (OUTPATIENT)
Dept: RADIOLOGY | Facility: MEDICAL CENTER | Age: 42
End: 2022-11-30
Attending: OBSTETRICS & GYNECOLOGY
Payer: COMMERCIAL

## 2022-12-12 ENCOUNTER — APPOINTMENT (OUTPATIENT)
Dept: MEDICAL GROUP | Facility: MEDICAL CENTER | Age: 42
End: 2022-12-12
Payer: COMMERCIAL

## 2022-12-23 ENCOUNTER — PRE-ADMISSION TESTING (OUTPATIENT)
Dept: ADMISSIONS | Facility: MEDICAL CENTER | Age: 42
DRG: 743 | End: 2022-12-23
Attending: OBSTETRICS & GYNECOLOGY
Payer: COMMERCIAL

## 2022-12-23 DIAGNOSIS — Z01.812 PRE-OPERATIVE LABORATORY EXAMINATION: ICD-10-CM

## 2022-12-23 DIAGNOSIS — Z01.810 PRE-OPERATIVE CARDIOVASCULAR EXAMINATION: ICD-10-CM

## 2022-12-23 LAB
ABO GROUP BLD: NORMAL
ANION GAP SERPL CALC-SCNC: 12 MMOL/L (ref 7–16)
APTT PPP: 30.4 SEC (ref 24.7–36)
B-HCG SERPL-ACNC: <1 MIU/ML (ref 0–5)
BASOPHILS # BLD AUTO: 0.3 % (ref 0–1.8)
BASOPHILS # BLD: 0.02 K/UL (ref 0–0.12)
BLD GP AB SCN SERPL QL: NORMAL
BUN SERPL-MCNC: 7 MG/DL (ref 8–22)
CALCIUM SERPL-MCNC: 9.4 MG/DL (ref 8.5–10.5)
CHLORIDE SERPL-SCNC: 103 MMOL/L (ref 96–112)
CO2 SERPL-SCNC: 22 MMOL/L (ref 20–33)
CREAT SERPL-MCNC: 0.77 MG/DL (ref 0.5–1.4)
EKG IMPRESSION: NORMAL
EOSINOPHIL # BLD AUTO: 0.1 K/UL (ref 0–0.51)
EOSINOPHIL NFR BLD: 1.3 % (ref 0–6.9)
ERYTHROCYTE [DISTWIDTH] IN BLOOD BY AUTOMATED COUNT: 52.5 FL (ref 35.9–50)
GFR SERPLBLD CREATININE-BSD FMLA CKD-EPI: 98 ML/MIN/1.73 M 2
GLUCOSE SERPL-MCNC: 79 MG/DL (ref 65–99)
HCG SERPL QL: NEGATIVE
HCT VFR BLD AUTO: 39.4 % (ref 37–47)
HGB BLD-MCNC: 12.4 G/DL (ref 12–16)
IMM GRANULOCYTES # BLD AUTO: 0.03 K/UL (ref 0–0.11)
IMM GRANULOCYTES NFR BLD AUTO: 0.4 % (ref 0–0.9)
INR PPP: 1.02 (ref 0.87–1.13)
LYMPHOCYTES # BLD AUTO: 1.91 K/UL (ref 1–4.8)
LYMPHOCYTES NFR BLD: 24.7 % (ref 22–41)
MCH RBC QN AUTO: 27.4 PG (ref 27–33)
MCHC RBC AUTO-ENTMCNC: 31.5 G/DL (ref 33.6–35)
MCV RBC AUTO: 87 FL (ref 81.4–97.8)
MONOCYTES # BLD AUTO: 0.26 K/UL (ref 0–0.85)
MONOCYTES NFR BLD AUTO: 3.4 % (ref 0–13.4)
NEUTROPHILS # BLD AUTO: 5.4 K/UL (ref 2–7.15)
NEUTROPHILS NFR BLD: 69.9 % (ref 44–72)
NRBC # BLD AUTO: 0 K/UL
NRBC BLD-RTO: 0 /100 WBC
PLATELET # BLD AUTO: 352 K/UL (ref 164–446)
PMV BLD AUTO: 10.5 FL (ref 9–12.9)
POTASSIUM SERPL-SCNC: 4.2 MMOL/L (ref 3.6–5.5)
PROTHROMBIN TIME: 13.3 SEC (ref 12–14.6)
RBC # BLD AUTO: 4.53 M/UL (ref 4.2–5.4)
RH BLD: NORMAL
SODIUM SERPL-SCNC: 137 MMOL/L (ref 135–145)
WBC # BLD AUTO: 7.7 K/UL (ref 4.8–10.8)

## 2022-12-23 PROCEDURE — 84702 CHORIONIC GONADOTROPIN TEST: CPT

## 2022-12-23 PROCEDURE — 86901 BLOOD TYPING SEROLOGIC RH(D): CPT

## 2022-12-23 PROCEDURE — 36415 COLL VENOUS BLD VENIPUNCTURE: CPT

## 2022-12-23 PROCEDURE — 93010 ELECTROCARDIOGRAM REPORT: CPT | Performed by: INTERNAL MEDICINE

## 2022-12-23 PROCEDURE — 85025 COMPLETE CBC W/AUTO DIFF WBC: CPT

## 2022-12-23 PROCEDURE — 86850 RBC ANTIBODY SCREEN: CPT

## 2022-12-23 PROCEDURE — 80048 BASIC METABOLIC PNL TOTAL CA: CPT

## 2022-12-23 PROCEDURE — 86900 BLOOD TYPING SEROLOGIC ABO: CPT

## 2022-12-23 PROCEDURE — 85610 PROTHROMBIN TIME: CPT

## 2022-12-23 PROCEDURE — 85730 THROMBOPLASTIN TIME PARTIAL: CPT

## 2022-12-23 PROCEDURE — 93005 ELECTROCARDIOGRAM TRACING: CPT

## 2022-12-23 PROCEDURE — 84703 CHORIONIC GONADOTROPIN ASSAY: CPT

## 2022-12-23 ASSESSMENT — FIBROSIS 4 INDEX: FIB4 SCORE: 0.67

## 2022-12-27 NOTE — H&P
DATE OF ADMISSION:  2022     ADMITTING DIAGNOSES:  1.  Symptomatic uterine fibroids measuring 8.3 cm.  2.  Abnormal uterine bleeding.  3.  Pelvic pressure.     HISTORY OF PRESENT ILLNESS:  This patient is a 42-year-old  2, para 2    female who has a history of known uterine fibroids.  She   transferred care to me from Dr. Herrera. Dr. Herrera did perform an   endometrial biopsy, which was negative.  I discussed with the patient her   options of a myomectomy versus a hysterectomy via laparotomy secondary to the   size of the uterus versus referral to Dr. Gramajo for a robotic hysterectomy.  The   patient did have a consult with interventional radiology for embolization of   the fibroid and this was performed on 2022.  They performed an MRI and   the MRI showed that she had a large fibroid at the dorsal fundus measuring 8.3   cm.  She also had a probable small submucosal fibroid on the right and the   lower uterine segment and a smaller fibroid in the ventral fundus measuring up   to 12 mm.  The patient then returned to see me wanting to proceed with a   total abdominal hysterectomy.  The patient states that she wants a   hysterectomy before the end of the year since she is getting  and Dr. Gramajo could not schedule her for evaluation prior to the New .  I did   discuss with the patient.  The risks of surgery including infection, bleeding,   damage to adjacent organs like bowel, bladder, ureter, or major blood vessel.    The patient is aware that once she has had a hysterectomy that she cannot   get pregnant in the future.  The patient at this time has no unanswered   questions and wants to proceed.  The patient also declines embolization by IR.     PAST MEDICAL HISTORY:  Symptomatic uterine fibroids.     PAST SURGICAL HISTORY:  1.  Previous abdominoplasty.  2.  Steroids neck injections, 2021.     MEDICATIONS:  She had Depo-Provera 150 mg IM on 2022 to help  with the   vaginal bleeding.     ALLERGIES:  OMNICEF CAUSES A RASH.     OBSTETRICAL HISTORY:  She is a  2, para 2.  The patient previously had   2 previous normal spontaneous vaginal deliveries at term.     GYNECOLOGIC HISTORY:  The patient started menstruating at age 13, is having   heavy menstrual cycles, lasting 7-10 days with a lot of bleeding and a lot of   cramping. Spouse had a vasectomy.  No history of STDs.  On 2021, she had   a negative Pap, negative HPV.     SOCIAL HISTORY:  The patient is , but planning on getting .    Denies tobacco, alcohol, or drug use.     PHYSICAL EXAMINATION:    VITAL SIGNS:  Blood pressure 120/76, heart rate 72, weight 167 pounds.  GENERAL:  Pleasant female in no acute distress.  LUNGS:  Clear to auscultation bilaterally.  CARDIOVASCULAR:  Regular rate and rhythm.  No murmur.  ABDOMEN:  Soft, nontender, nondistended.  EXTREMITIES:  No calf tenderness.  GENITOURINARY:  Normal external female genitalia.  Vagina without any lesions   or discharge.  Cervix, no lesions or discharge.  Anteverted uterus about   8-week size and globular.  Adnexa, no adnexal masses.  EXTREMITIES:  No calf tenderness.     DIAGNOSTICS:  The patient's previous endometrial biopsy performed on   2022, benign secretory endometrium.  Again, MRI performed at [facility]   shows a large fibroid at the dorsal fundus measuring 8.3 cm and 2 smaller   fibroids.  The patient's current labs, her H and H is 12.4 and 39.4 and   platelets are 352.  HCG is negative.  BMP is normal with a creatinine of 0.77.     ASSESSMENT AND PLAN:  1.  A 42-year-old  2, para 2  female.  2.  Symptomatic uterine fibroids.  The patient with heavy painful periods and   pelvic pressure.  She had a large fibroid measuring 8.3 cm.  I discussed with   the patient that because of the size of the uterus with the fibroid that she   will need to have an abdominal hysterectomy versus referral to Dr. Gramajo  for a   robotic hysterectomy versus interventional radiology for embolization of the   fibroid.  The patient declines an embolization of the fibroids.  She already   consulted with IR.  She did try getting an appointment with Dr. Gramajo, but wants   her surgery before the end of the year and therefore wants to proceed with a   total abdominal hysterectomy.  I have discussed with the patient the risk of   infection, bleeding, damage to adjacent organs like bowel, bladder, ureter,   major blood vessel.  She has no unanswered questions and wants to proceed.  We   will leave the ovaries in place as they are normal.        ______________________________  MD CT FRANCOIS/REINIER    DD:  12/26/2022 15:32  DT:  12/26/2022 16:36    Job#:  [id number]

## 2022-12-30 ENCOUNTER — ANESTHESIA EVENT (OUTPATIENT)
Dept: SURGERY | Facility: MEDICAL CENTER | Age: 42
DRG: 743 | End: 2022-12-30
Payer: COMMERCIAL

## 2022-12-30 ENCOUNTER — ANESTHESIA (OUTPATIENT)
Dept: SURGERY | Facility: MEDICAL CENTER | Age: 42
DRG: 743 | End: 2022-12-30
Payer: COMMERCIAL

## 2022-12-30 ENCOUNTER — HOSPITAL ENCOUNTER (INPATIENT)
Facility: MEDICAL CENTER | Age: 42
LOS: 1 days | DRG: 743 | End: 2022-12-31
Attending: OBSTETRICS & GYNECOLOGY | Admitting: OBSTETRICS & GYNECOLOGY
Payer: COMMERCIAL

## 2022-12-30 DIAGNOSIS — Z09 SURGERY FOLLOW-UP: ICD-10-CM

## 2022-12-30 PROBLEM — Z90.710 HISTORY OF ABDOMINAL HYSTERECTOMY: Status: ACTIVE | Noted: 2022-12-30

## 2022-12-30 LAB
ABO + RH BLD: NORMAL
HCG UR QL: NEGATIVE
PATHOLOGY CONSULT NOTE: NORMAL

## 2022-12-30 PROCEDURE — 81025 URINE PREGNANCY TEST: CPT

## 2022-12-30 PROCEDURE — 160002 HCHG RECOVERY MINUTES (STAT): Performed by: OBSTETRICS & GYNECOLOGY

## 2022-12-30 PROCEDURE — 110371 HCHG SHELL REV 272: Performed by: OBSTETRICS & GYNECOLOGY

## 2022-12-30 PROCEDURE — 700111 HCHG RX REV CODE 636 W/ 250 OVERRIDE (IP): Performed by: OBSTETRICS & GYNECOLOGY

## 2022-12-30 PROCEDURE — 160042 HCHG SURGERY MINUTES - EA ADDL 1 MIN LEVEL 5: Performed by: OBSTETRICS & GYNECOLOGY

## 2022-12-30 PROCEDURE — 700102 HCHG RX REV CODE 250 W/ 637 OVERRIDE(OP): Performed by: STUDENT IN AN ORGANIZED HEALTH CARE EDUCATION/TRAINING PROGRAM

## 2022-12-30 PROCEDURE — A9270 NON-COVERED ITEM OR SERVICE: HCPCS | Performed by: OBSTETRICS & GYNECOLOGY

## 2022-12-30 PROCEDURE — 700101 HCHG RX REV CODE 250: Performed by: STUDENT IN AN ORGANIZED HEALTH CARE EDUCATION/TRAINING PROGRAM

## 2022-12-30 PROCEDURE — 700105 HCHG RX REV CODE 258: Performed by: OBSTETRICS & GYNECOLOGY

## 2022-12-30 PROCEDURE — 160048 HCHG OR STATISTICAL LEVEL 1-5: Performed by: OBSTETRICS & GYNECOLOGY

## 2022-12-30 PROCEDURE — 160031 HCHG SURGERY MINUTES - 1ST 30 MINS LEVEL 5: Performed by: OBSTETRICS & GYNECOLOGY

## 2022-12-30 PROCEDURE — 36415 COLL VENOUS BLD VENIPUNCTURE: CPT

## 2022-12-30 PROCEDURE — 88307 TISSUE EXAM BY PATHOLOGIST: CPT | Mod: 59

## 2022-12-30 PROCEDURE — 0UT90ZZ RESECTION OF UTERUS, OPEN APPROACH: ICD-10-PCS | Performed by: OBSTETRICS & GYNECOLOGY

## 2022-12-30 PROCEDURE — A9270 NON-COVERED ITEM OR SERVICE: HCPCS | Performed by: STUDENT IN AN ORGANIZED HEALTH CARE EDUCATION/TRAINING PROGRAM

## 2022-12-30 PROCEDURE — 160035 HCHG PACU - 1ST 60 MINS PHASE I: Performed by: OBSTETRICS & GYNECOLOGY

## 2022-12-30 PROCEDURE — 00840 ANES IPER PX LOWER ABD NOS: CPT | Performed by: STUDENT IN AN ORGANIZED HEALTH CARE EDUCATION/TRAINING PROGRAM

## 2022-12-30 PROCEDURE — 0UT70ZZ RESECTION OF BILATERAL FALLOPIAN TUBES, OPEN APPROACH: ICD-10-PCS | Performed by: OBSTETRICS & GYNECOLOGY

## 2022-12-30 PROCEDURE — 700111 HCHG RX REV CODE 636 W/ 250 OVERRIDE (IP): Performed by: STUDENT IN AN ORGANIZED HEALTH CARE EDUCATION/TRAINING PROGRAM

## 2022-12-30 PROCEDURE — 160009 HCHG ANES TIME/MIN: Performed by: OBSTETRICS & GYNECOLOGY

## 2022-12-30 PROCEDURE — 700102 HCHG RX REV CODE 250 W/ 637 OVERRIDE(OP): Performed by: OBSTETRICS & GYNECOLOGY

## 2022-12-30 PROCEDURE — 770001 HCHG ROOM/CARE - MED/SURG/GYN PRIV*

## 2022-12-30 RX ORDER — AMOXICILLIN 250 MG
1 CAPSULE ORAL
Status: DISCONTINUED | OUTPATIENT
Start: 2022-12-30 | End: 2022-12-31 | Stop reason: HOSPADM

## 2022-12-30 RX ORDER — CLINDAMYCIN PHOSPHATE 900 MG/50ML
INJECTION, SOLUTION INTRAVENOUS
Status: DISPENSED
Start: 2022-12-30 | End: 2022-12-31

## 2022-12-30 RX ORDER — ROCURONIUM BROMIDE 10 MG/ML
INJECTION, SOLUTION INTRAVENOUS PRN
Status: DISCONTINUED | OUTPATIENT
Start: 2022-12-30 | End: 2022-12-30 | Stop reason: SURG

## 2022-12-30 RX ORDER — HYDROMORPHONE HYDROCHLORIDE 1 MG/ML
0.4 INJECTION, SOLUTION INTRAMUSCULAR; INTRAVENOUS; SUBCUTANEOUS
Status: DISCONTINUED | OUTPATIENT
Start: 2022-12-30 | End: 2022-12-30 | Stop reason: HOSPADM

## 2022-12-30 RX ORDER — CELECOXIB 200 MG/1
400 CAPSULE ORAL ONCE
Status: COMPLETED | OUTPATIENT
Start: 2022-12-30 | End: 2022-12-30

## 2022-12-30 RX ORDER — OXYCODONE HYDROCHLORIDE AND ACETAMINOPHEN 5; 325 MG/1; MG/1
1 TABLET ORAL EVERY 4 HOURS PRN
Qty: 28 TABLET | Refills: 0 | Status: SHIPPED | OUTPATIENT
Start: 2022-12-30 | End: 2023-01-06

## 2022-12-30 RX ORDER — HALOPERIDOL 5 MG/ML
1 INJECTION INTRAMUSCULAR EVERY 6 HOURS PRN
Status: DISCONTINUED | OUTPATIENT
Start: 2022-12-30 | End: 2022-12-31 | Stop reason: HOSPADM

## 2022-12-30 RX ORDER — ALBUTEROL SULFATE 2.5 MG/3ML
2.5 SOLUTION RESPIRATORY (INHALATION)
Status: DISCONTINUED | OUTPATIENT
Start: 2022-12-30 | End: 2022-12-30 | Stop reason: HOSPADM

## 2022-12-30 RX ORDER — DIPHENHYDRAMINE HYDROCHLORIDE 50 MG/ML
12.5 INJECTION INTRAMUSCULAR; INTRAVENOUS
Status: DISCONTINUED | OUTPATIENT
Start: 2022-12-30 | End: 2022-12-30 | Stop reason: HOSPADM

## 2022-12-30 RX ORDER — OXYCODONE HCL 5 MG/5 ML
10 SOLUTION, ORAL ORAL
Status: COMPLETED | OUTPATIENT
Start: 2022-12-30 | End: 2022-12-30

## 2022-12-30 RX ORDER — HYDRALAZINE HYDROCHLORIDE 20 MG/ML
5 INJECTION INTRAMUSCULAR; INTRAVENOUS
Status: DISCONTINUED | OUTPATIENT
Start: 2022-12-30 | End: 2022-12-30 | Stop reason: HOSPADM

## 2022-12-30 RX ORDER — LIDOCAINE HYDROCHLORIDE 10 MG/ML
INJECTION, SOLUTION EPIDURAL; INFILTRATION; INTRACAUDAL; PERINEURAL PRN
Status: DISCONTINUED | OUTPATIENT
Start: 2022-12-30 | End: 2022-12-30 | Stop reason: SURG

## 2022-12-30 RX ORDER — BISACODYL 10 MG
10 SUPPOSITORY, RECTAL RECTAL
Status: DISCONTINUED | OUTPATIENT
Start: 2022-12-30 | End: 2022-12-31 | Stop reason: HOSPADM

## 2022-12-30 RX ORDER — ACETAMINOPHEN 500 MG
1000 TABLET ORAL EVERY 6 HOURS PRN
Status: DISCONTINUED | OUTPATIENT
Start: 2023-01-04 | End: 2022-12-31 | Stop reason: HOSPADM

## 2022-12-30 RX ORDER — OXYCODONE HCL 5 MG/5 ML
5 SOLUTION, ORAL ORAL
Status: COMPLETED | OUTPATIENT
Start: 2022-12-30 | End: 2022-12-30

## 2022-12-30 RX ORDER — OXYCODONE HYDROCHLORIDE 5 MG/1
5 TABLET ORAL
Status: DISCONTINUED | OUTPATIENT
Start: 2022-12-30 | End: 2022-12-31 | Stop reason: HOSPADM

## 2022-12-30 RX ORDER — HYDROMORPHONE HYDROCHLORIDE 2 MG/ML
INJECTION, SOLUTION INTRAMUSCULAR; INTRAVENOUS; SUBCUTANEOUS PRN
Status: DISCONTINUED | OUTPATIENT
Start: 2022-12-30 | End: 2022-12-30 | Stop reason: SURG

## 2022-12-30 RX ORDER — MEPERIDINE HYDROCHLORIDE 25 MG/ML
12.5 INJECTION INTRAMUSCULAR; INTRAVENOUS; SUBCUTANEOUS
Status: DISCONTINUED | OUTPATIENT
Start: 2022-12-30 | End: 2022-12-30 | Stop reason: HOSPADM

## 2022-12-30 RX ORDER — IBUPROFEN 800 MG/1
800 TABLET ORAL 3 TIMES DAILY
Status: DISCONTINUED | OUTPATIENT
Start: 2022-12-30 | End: 2022-12-31 | Stop reason: HOSPADM

## 2022-12-30 RX ORDER — ACETAMINOPHEN 500 MG
1000 TABLET ORAL ONCE
Status: COMPLETED | OUTPATIENT
Start: 2022-12-30 | End: 2022-12-30

## 2022-12-30 RX ORDER — SODIUM CHLORIDE, SODIUM LACTATE, POTASSIUM CHLORIDE, CALCIUM CHLORIDE 600; 310; 30; 20 MG/100ML; MG/100ML; MG/100ML; MG/100ML
INJECTION, SOLUTION INTRAVENOUS CONTINUOUS
Status: ACTIVE | OUTPATIENT
Start: 2022-12-30 | End: 2022-12-30

## 2022-12-30 RX ORDER — DEXAMETHASONE SODIUM PHOSPHATE 4 MG/ML
INJECTION, SOLUTION INTRA-ARTICULAR; INTRALESIONAL; INTRAMUSCULAR; INTRAVENOUS; SOFT TISSUE PRN
Status: DISCONTINUED | OUTPATIENT
Start: 2022-12-30 | End: 2022-12-30 | Stop reason: SURG

## 2022-12-30 RX ORDER — HYDROMORPHONE HYDROCHLORIDE 1 MG/ML
0.25 INJECTION, SOLUTION INTRAMUSCULAR; INTRAVENOUS; SUBCUTANEOUS
Status: DISCONTINUED | OUTPATIENT
Start: 2022-12-30 | End: 2022-12-31 | Stop reason: HOSPADM

## 2022-12-30 RX ORDER — ENEMA 19; 7 G/133ML; G/133ML
1 ENEMA RECTAL
Status: DISCONTINUED | OUTPATIENT
Start: 2022-12-30 | End: 2022-12-31 | Stop reason: HOSPADM

## 2022-12-30 RX ORDER — ONDANSETRON 2 MG/ML
INJECTION INTRAMUSCULAR; INTRAVENOUS PRN
Status: DISCONTINUED | OUTPATIENT
Start: 2022-12-30 | End: 2022-12-30 | Stop reason: SURG

## 2022-12-30 RX ORDER — MIDAZOLAM HYDROCHLORIDE 1 MG/ML
INJECTION INTRAMUSCULAR; INTRAVENOUS PRN
Status: DISCONTINUED | OUTPATIENT
Start: 2022-12-30 | End: 2022-12-30 | Stop reason: SURG

## 2022-12-30 RX ORDER — DEXAMETHASONE SODIUM PHOSPHATE 4 MG/ML
4 INJECTION, SOLUTION INTRA-ARTICULAR; INTRALESIONAL; INTRAMUSCULAR; INTRAVENOUS; SOFT TISSUE
Status: DISCONTINUED | OUTPATIENT
Start: 2022-12-30 | End: 2022-12-31 | Stop reason: HOSPADM

## 2022-12-30 RX ORDER — AMOXICILLIN 250 MG
1 CAPSULE ORAL NIGHTLY
Status: DISCONTINUED | OUTPATIENT
Start: 2022-12-30 | End: 2022-12-31 | Stop reason: HOSPADM

## 2022-12-30 RX ORDER — HYDROMORPHONE HYDROCHLORIDE 1 MG/ML
0.1 INJECTION, SOLUTION INTRAMUSCULAR; INTRAVENOUS; SUBCUTANEOUS
Status: DISCONTINUED | OUTPATIENT
Start: 2022-12-30 | End: 2022-12-30 | Stop reason: HOSPADM

## 2022-12-30 RX ORDER — ONDANSETRON 2 MG/ML
4 INJECTION INTRAMUSCULAR; INTRAVENOUS EVERY 4 HOURS PRN
Status: DISCONTINUED | OUTPATIENT
Start: 2022-12-30 | End: 2022-12-31 | Stop reason: HOSPADM

## 2022-12-30 RX ORDER — IBUPROFEN 800 MG/1
800 TABLET ORAL 3 TIMES DAILY PRN
Status: DISCONTINUED | OUTPATIENT
Start: 2023-01-04 | End: 2022-12-31 | Stop reason: HOSPADM

## 2022-12-30 RX ORDER — OXYCODONE HCL 10 MG/1
10 TABLET, FILM COATED, EXTENDED RELEASE ORAL ONCE
Status: DISCONTINUED | OUTPATIENT
Start: 2022-12-30 | End: 2022-12-30 | Stop reason: HOSPADM

## 2022-12-30 RX ORDER — IBUPROFEN 600 MG/1
600 TABLET ORAL EVERY 6 HOURS PRN
Qty: 60 TABLET | Refills: 1 | Status: SHIPPED | OUTPATIENT
Start: 2022-12-30 | End: 2023-06-07

## 2022-12-30 RX ORDER — ONDANSETRON 2 MG/ML
4 INJECTION INTRAMUSCULAR; INTRAVENOUS
Status: COMPLETED | OUTPATIENT
Start: 2022-12-30 | End: 2022-12-30

## 2022-12-30 RX ORDER — HALOPERIDOL 5 MG/ML
1 INJECTION INTRAMUSCULAR
Status: COMPLETED | OUTPATIENT
Start: 2022-12-30 | End: 2022-12-30

## 2022-12-30 RX ORDER — OXYCODONE HYDROCHLORIDE 5 MG/1
2.5 TABLET ORAL
Status: DISCONTINUED | OUTPATIENT
Start: 2022-12-30 | End: 2022-12-31 | Stop reason: HOSPADM

## 2022-12-30 RX ORDER — POLYETHYLENE GLYCOL 3350 17 G/17G
1 POWDER, FOR SOLUTION ORAL 2 TIMES DAILY PRN
Status: DISCONTINUED | OUTPATIENT
Start: 2022-12-30 | End: 2022-12-31 | Stop reason: HOSPADM

## 2022-12-30 RX ORDER — DOCUSATE SODIUM 100 MG/1
100 CAPSULE, LIQUID FILLED ORAL 2 TIMES DAILY
Qty: 60 CAPSULE | Refills: 1 | Status: SHIPPED | OUTPATIENT
Start: 2022-12-30 | End: 2023-06-07

## 2022-12-30 RX ORDER — SODIUM CHLORIDE, SODIUM LACTATE, POTASSIUM CHLORIDE, CALCIUM CHLORIDE 600; 310; 30; 20 MG/100ML; MG/100ML; MG/100ML; MG/100ML
INJECTION, SOLUTION INTRAVENOUS CONTINUOUS
Status: DISCONTINUED | OUTPATIENT
Start: 2022-12-30 | End: 2022-12-30 | Stop reason: HOSPADM

## 2022-12-30 RX ORDER — DIPHENHYDRAMINE HYDROCHLORIDE 50 MG/ML
25 INJECTION INTRAMUSCULAR; INTRAVENOUS EVERY 6 HOURS PRN
Status: DISCONTINUED | OUTPATIENT
Start: 2022-12-30 | End: 2022-12-31 | Stop reason: HOSPADM

## 2022-12-30 RX ORDER — ACETAMINOPHEN 500 MG
1000 TABLET ORAL EVERY 6 HOURS
Status: DISCONTINUED | OUTPATIENT
Start: 2022-12-30 | End: 2022-12-31 | Stop reason: HOSPADM

## 2022-12-30 RX ORDER — DOCUSATE SODIUM 100 MG/1
100 CAPSULE, LIQUID FILLED ORAL 2 TIMES DAILY
Status: DISCONTINUED | OUTPATIENT
Start: 2022-12-30 | End: 2022-12-31 | Stop reason: HOSPADM

## 2022-12-30 RX ORDER — HYDROMORPHONE HYDROCHLORIDE 1 MG/ML
0.2 INJECTION, SOLUTION INTRAMUSCULAR; INTRAVENOUS; SUBCUTANEOUS
Status: DISCONTINUED | OUTPATIENT
Start: 2022-12-30 | End: 2022-12-30 | Stop reason: HOSPADM

## 2022-12-30 RX ORDER — KETOROLAC TROMETHAMINE 30 MG/ML
INJECTION, SOLUTION INTRAMUSCULAR; INTRAVENOUS PRN
Status: DISCONTINUED | OUTPATIENT
Start: 2022-12-30 | End: 2022-12-30 | Stop reason: SURG

## 2022-12-30 RX ORDER — SCOLOPAMINE TRANSDERMAL SYSTEM 1 MG/1
1 PATCH, EXTENDED RELEASE TRANSDERMAL
Status: DISCONTINUED | OUTPATIENT
Start: 2022-12-30 | End: 2022-12-31 | Stop reason: HOSPADM

## 2022-12-30 RX ADMIN — DOCUSATE SODIUM 100 MG: 100 CAPSULE, LIQUID FILLED ORAL at 22:02

## 2022-12-30 RX ADMIN — FENTANYL CITRATE 50 MCG: 50 INJECTION, SOLUTION INTRAMUSCULAR; INTRAVENOUS at 15:05

## 2022-12-30 RX ADMIN — SODIUM CHLORIDE, POTASSIUM CHLORIDE, SODIUM LACTATE AND CALCIUM CHLORIDE: 600; 310; 30; 20 INJECTION, SOLUTION INTRAVENOUS at 12:49

## 2022-12-30 RX ADMIN — FENTANYL CITRATE 50 MCG: 50 INJECTION, SOLUTION INTRAMUSCULAR; INTRAVENOUS at 13:22

## 2022-12-30 RX ADMIN — CELECOXIB 400 MG: 200 CAPSULE ORAL at 11:55

## 2022-12-30 RX ADMIN — HYDROMORPHONE HYDROCHLORIDE 0.4 MG: 1 INJECTION, SOLUTION INTRAMUSCULAR; INTRAVENOUS; SUBCUTANEOUS at 16:45

## 2022-12-30 RX ADMIN — ONDANSETRON 4 MG: 2 INJECTION INTRAMUSCULAR; INTRAVENOUS at 22:01

## 2022-12-30 RX ADMIN — CLINDAMYCIN PHOSPHATE 900 MG: 150 INJECTION, SOLUTION INTRAMUSCULAR; INTRAVENOUS at 12:56

## 2022-12-30 RX ADMIN — HALOPERIDOL LACTATE 1 MG: 5 INJECTION, SOLUTION INTRAMUSCULAR at 16:44

## 2022-12-30 RX ADMIN — SENNOSIDES AND DOCUSATE SODIUM 1 TABLET: 50; 8.6 TABLET ORAL at 21:37

## 2022-12-30 RX ADMIN — MIDAZOLAM HYDROCHLORIDE 2 MG: 1 INJECTION, SOLUTION INTRAMUSCULAR; INTRAVENOUS at 12:50

## 2022-12-30 RX ADMIN — SODIUM CHLORIDE, POTASSIUM CHLORIDE, SODIUM LACTATE AND CALCIUM CHLORIDE: 600; 310; 30; 20 INJECTION, SOLUTION INTRAVENOUS at 11:55

## 2022-12-30 RX ADMIN — ROCURONIUM BROMIDE 10 MG: 10 INJECTION, SOLUTION INTRAVENOUS at 13:21

## 2022-12-30 RX ADMIN — ROCURONIUM BROMIDE 40 MG: 10 INJECTION, SOLUTION INTRAVENOUS at 12:53

## 2022-12-30 RX ADMIN — LIDOCAINE HYDROCHLORIDE 30 MG: 10 INJECTION, SOLUTION EPIDURAL; INFILTRATION; INTRACAUDAL; PERINEURAL at 12:53

## 2022-12-30 RX ADMIN — ACETAMINOPHEN 1000 MG: 500 TABLET ORAL at 11:56

## 2022-12-30 RX ADMIN — DIPHENHYDRAMINE HYDROCHLORIDE 25 MG: 50 INJECTION INTRAMUSCULAR; INTRAVENOUS at 18:30

## 2022-12-30 RX ADMIN — ONDANSETRON HYDROCHLORIDE 4 MG: 2 SOLUTION INTRAMUSCULAR; INTRAVENOUS at 16:04

## 2022-12-30 RX ADMIN — IBUPROFEN 800 MG: 800 TABLET, FILM COATED ORAL at 22:02

## 2022-12-30 RX ADMIN — ACETAMINOPHEN 1000 MG: 500 TABLET ORAL at 21:37

## 2022-12-30 RX ADMIN — HYDROMORPHONE HYDROCHLORIDE 0.2 MG: 1 INJECTION, SOLUTION INTRAMUSCULAR; INTRAVENOUS; SUBCUTANEOUS at 16:06

## 2022-12-30 RX ADMIN — OXYCODONE HYDROCHLORIDE 10 MG: 5 SOLUTION ORAL at 14:56

## 2022-12-30 RX ADMIN — HYDROMORPHONE HYDROCHLORIDE 0.4 MCG: 2 INJECTION INTRAMUSCULAR; INTRAVENOUS; SUBCUTANEOUS at 13:48

## 2022-12-30 RX ADMIN — FENTANYL CITRATE 50 MCG: 50 INJECTION, SOLUTION INTRAMUSCULAR; INTRAVENOUS at 13:11

## 2022-12-30 RX ADMIN — SCOPOLAMINE 1 PATCH: 1.5 PATCH, EXTENDED RELEASE TRANSDERMAL at 18:30

## 2022-12-30 RX ADMIN — ONDANSETRON 4 MG: 2 INJECTION INTRAMUSCULAR; INTRAVENOUS at 14:23

## 2022-12-30 RX ADMIN — FENTANYL CITRATE 50 MCG: 50 INJECTION, SOLUTION INTRAMUSCULAR; INTRAVENOUS at 14:56

## 2022-12-30 RX ADMIN — HALOPERIDOL LACTATE 1 MG: 5 INJECTION, SOLUTION INTRAMUSCULAR at 16:20

## 2022-12-30 RX ADMIN — FENTANYL CITRATE 100 MCG: 50 INJECTION, SOLUTION INTRAMUSCULAR; INTRAVENOUS at 12:53

## 2022-12-30 RX ADMIN — PROPOFOL 150 MG: 10 INJECTION, EMULSION INTRAVENOUS at 12:53

## 2022-12-30 RX ADMIN — SUGAMMADEX 200 MG: 100 INJECTION, SOLUTION INTRAVENOUS at 14:23

## 2022-12-30 RX ADMIN — KETOROLAC TROMETHAMINE 30 MG: 30 INJECTION, SOLUTION INTRAMUSCULAR at 14:23

## 2022-12-30 RX ADMIN — HYDROMORPHONE HYDROCHLORIDE 0.2 MCG: 2 INJECTION INTRAMUSCULAR; INTRAVENOUS; SUBCUTANEOUS at 14:35

## 2022-12-30 RX ADMIN — DEXAMETHASONE SODIUM PHOSPHATE 4 MG: 4 INJECTION, SOLUTION INTRA-ARTICULAR; INTRALESIONAL; INTRAMUSCULAR; INTRAVENOUS; SOFT TISSUE at 12:56

## 2022-12-30 RX ADMIN — FENTANYL CITRATE 50 MCG: 50 INJECTION, SOLUTION INTRAMUSCULAR; INTRAVENOUS at 13:47

## 2022-12-30 ASSESSMENT — PAIN DESCRIPTION - PAIN TYPE
TYPE: SURGICAL PAIN

## 2022-12-30 ASSESSMENT — PAIN SCALES - GENERAL: PAIN_LEVEL: 5

## 2022-12-30 ASSESSMENT — FIBROSIS 4 INDEX: FIB4 SCORE: 0.7

## 2022-12-30 NOTE — OP REPORT
mmediate Post OP Note    PreOp Diagnosis:   1.  Symptomatic uterine fibroids measuring 8.3 cm.  2.  Abnormal uterine bleeding.  3.  Pelvic pressure.    PostOp Diagnosis: same      Procedure(s):  TOTAL ABDOMINAL HYSTERECTOMY BILATERAL SALPINGECTOMY AND ANY OTHER MEDICALLY NECESSARY PROCEDURES. - Wound Class: Clean Contaminated  SALPINGECTOMY - Wound Class: Clean Contaminated    Surgeon(s):  ANANDA Melissa M.D.    Anesthesiologist/Type of Anesthesia:  Anesthesiologist: Jerome Marquez D.O./General    Surgical Staff:  Circulator: Tori Roy R.N.; Reva Watson R.N.  Relief Circulator: Elizabeth Neal R.N.  Scrub Person: Sakshi Carlin    Specimens removed if any:  ID Type Source Tests Collected by Time Destination   A : Uterus, Cervix, Bilateral Fallopian Tubes Other Other PATHOLOGY SPECIMEN Jojo Rodríguez M.D. 12/30/2022  1:24 PM        Estimated Blood Loss: 100 ml  IVF: 1 liter LR  UO: 175 cc      Findings: av uterus about 8 weeks size with an 8 cm fundal fibroid, normal bilateral fallopian tubes and ovaries, normal appendix.     Complications: none

## 2022-12-30 NOTE — ANESTHESIA POSTPROCEDURE EVALUATION
Patient: Malorie Katz    Procedure Summary     Date: 12/30/22 Room / Location: [facility] ROOM 22 / SURGERY SAME DAY [facility]    Anesthesia Start: 1249 Anesthesia Stop: 1446    Procedures:       TOTAL ABDOMINAL HYSTERECTOMY BILATERAL SALPINGECTOMY AND ANY OTHER MEDICALLY NECESSARY PROCEDURES. (Bilateral: Abdomen)      SALPINGECTOMY (Bilateral: Abdomen) Diagnosis: (MENORRHAGIA, DYSMENORRHEA, FIBROID)    Surgeons: Jojo Rodríguez M.D. Responsible Provider: Jerome Marquez D.O.    Anesthesia Type: general ASA Status: 2          Final Anesthesia Type: general  Last vitals  BP        Temp   36.7 °C (98.1 °F)    Pulse   66   Resp        SpO2   98 %      Anesthesia Post Evaluation    Patient location during evaluation: PACU  Patient participation: complete - patient participated  Level of consciousness: awake and alert  Pain score: 5    Airway patency: patent  Anesthetic complications: no  Cardiovascular status: hemodynamically stable  Respiratory status: acceptable  Hydration status: euvolemic    PONV: none          No notable events documented.     Nurse Pain Score: 0 (NPRS)

## 2022-12-30 NOTE — OR NURSING
"After IV insertion the pt complians of IV at insertion site, the IV flushes and great blood return. Pt stated she is sensitive to the feeling of IVs. Heat pack applied for distraction, she also tells me that she feels \"off\" and her color went more pale and she became diaphoretic. Placed pt in a pt down position and provided a cool air fan, wash cloth and aroma therapy along with comforting environment by her friend with distractive videos. Side rails up for now, RN cont to check on pt. Call light is in reach and the bed is locked and in lowest position, MD mendez was also updated.  "

## 2022-12-30 NOTE — ANESTHESIA PREPROCEDURE EVALUATION
Case: [id number] Date/Time: 12/30/22 1215    Procedures:       TOTAL ABDOMINAL HYSTERECTOMY BILATERAL SALPINGECTOMY AND ANY OTHER MEDICALLY NECESSARY PROCEDURES.      SALPINGECTOMY    Pre-op diagnosis: MENORRHAGIA, DYSMENORRHEA, FIBROID    Location: [facility] ROOM 22 / SURGERY SAME DAY [facility]    Surgeons: Jojo Rodríguez M.D.          Relevant Problems   NEURO   (positive) Chronic daily headache       Physical Exam    Airway   Mallampati: II  TM distance: >3 FB  Neck ROM: full       Cardiovascular - normal exam  Rhythm: regular  Rate: normal  (-) murmur     Dental - normal exam           Pulmonary - normal exam  Breath sounds clear to auscultation     Abdominal    Neurological - normal exam                 Anesthesia Plan    ASA 2       Plan - general       Airway plan will be ETT          Induction: intravenous    Postoperative Plan: Postoperative administration of opioids is intended.    Pertinent diagnostic labs and testing reviewed    Informed Consent:    Anesthetic plan and risks discussed with patient.    Use of blood products discussed with: patient whom consented to blood products.

## 2022-12-30 NOTE — OR SURGEON
Immediate Post OP Note    PreOp Diagnosis:   1.  Symptomatic uterine fibroids measuring 8.3 cm.  2.  Abnormal uterine bleeding.  3.  Pelvic pressure.    PostOp Diagnosis: same      Procedure(s):  TOTAL ABDOMINAL HYSTERECTOMY BILATERAL SALPINGECTOMY AND ANY OTHER MEDICALLY NECESSARY PROCEDURES. - Wound Class: Clean Contaminated  SALPINGECTOMY - Wound Class: Clean Contaminated    Surgeon(s):  ANANDA Melissa M.D.    Anesthesiologist/Type of Anesthesia:  Anesthesiologist: Jerome Marquez D.O./General    Surgical Staff:  Circulator: Tori Roy R.N.; Reva Watson R.N.  Relief Circulator: Elizabeth Neal R.N.  Scrub Person: Sakshi Carlin    Specimens removed if any:  ID Type Source Tests Collected by Time Destination   A : Uterus, Cervix, Bilateral Fallopian Tubes Other Other PATHOLOGY SPECIMEN Jojo Rodríguez M.D. 12/30/2022  1:24 PM        Estimated Blood Loss: 100 ml  IVF: 1 liter LR  UO: 175 cc      Findings: av uterus about 8 weeks size with an 8 cm fundal fibroid, normal bilateral fallopian tubes and ovaries, normal appendix.     Complications: none        12/30/2022 2:38 PM Jojo Rodríguez M.D.

## 2022-12-30 NOTE — ANESTHESIA TIME REPORT
Anesthesia Start and Stop Event Times     Date Time Event    12/30/2022 1150 Ready for Procedure     1249 Anesthesia Start     1446 Anesthesia Stop        Responsible Staff  12/30/22    Name Role Begin End    Jerome Marquez D.O. Anesth 1249 1443        Overtime Reason:  no overtime (within assigned shift)    Comments:

## 2022-12-30 NOTE — OP REPORT
DATE OF SERVICE:  12/30/2022     PREOPERATIVE DIAGNOSES:  1.  Symptomatic uterine fibroids measuring up to 8.3 cm.  2.  Abnormal uterine bleeding.  3.  Pelvic pressure.     POSTOPERATIVE DIAGNOSES:  1.  Symptomatic uterine fibroids measuring up to 8.3 cm.  2.  Abnormal uterine bleeding.  3.  Pelvic pressure.     PROCEDURES:  1.  Exam under anesthesia.  2.  Total abdominal hysterectomy.  3.  Bilateral salpingectomy.     SURGEON:  Jojo Rodríguez MD     ASSISTANT:  Dee Dee Barrett MD     ANESTHESIOLOGIST:  Jerome Marquez DO     TYPE OF ANESTHESIA:  General endotracheal anesthesia.     INTRAVENOUS FLUIDS:  1 liter of LR.     URINE OUTPUT:  175 mL clear urine at the end of the procedure.     ESTIMATED BLOOD LOSS:  100 mL.     COMPLICATIONS:  None.     RECOVERY:  Stable to the PACU.     FINDINGS:  Anteverted uterus about 8-week size with an 8 cm fundal fibroid,   normal bilateral fallopian tubes and ovaries, normal appendix.     DESCRIPTION OF PROCEDURE:  The patient was taken to the operating room where   she received uncomplicated general endotracheal anesthesia.  She was given   clindamycin 900 mg IV secondary to PENICILLIN ALLERGY.  She was prepped and   draped in the usual sterile fashion.  A Marx was placed to drain her bladder.    A Pfannenstiel skin incision was made over her previous incision and   extended down to the fascia with the Bovie.  Fascia was incised in the midline   and extended laterally with Molina scissors.  The inferior aspect of the fascia   was grasped with Kocher clamps, elevated and tented up.  Molina scissors were   used to separate the rectus from the fascia.  In a similar fashion, superior   aspect of the fascia was grasped with Kocher clamps, elevated and tented up.    Molina scissors were used to separate the rectus from the fascia.  Midline   identified and entered sharply with Metzenbaum scissors and extended   superiorly and inferiorly.  The large Delroy O was then introduced into  the   pelvis to retract the abdominal wall.  The bowel was packed with 3 moist lap   tapes and the patient was placed in Trendelenburg position. Kocher clamps were   used to place on the cornea, 1 on the left and 1 on the right side of the   uterus.  She had a large fundal fibroid about 8.5 cm.  The round ligament on   the left was doubly clamped, cut and suture ligated with 0 Vicryl on a CT1   needle without any problem.  The fallopian tube was grasped with a Burley   clamp.  The mesosalpinx was doubly cauterized and cut and the fallopian tube   was excised and handed over to the scrub tech.  The uteroovarian ligament on   the left was doubly clamped, cut and suture ligated with 0 Vicryl on a CT1   needle.  The uterine artery was then skeletonized.  The vesicouterine   peritoneum was then identified and entered sharply with Metzenbaum scissors   and a bladder flap created bluntly.  In a same fashion, the right round   ligament was doubly clamped, cut and suture ligated with 0 Vicryl on a CT1   needle.  The right fallopian tube was grasped at the fimbria and the   mesosalpinx was doubly cauterized and cut with the LigaSure and the fallopian   tube was excised and handed over to the scrub tech.  The uteroovarian ligament   on the right was doubly clamped, cut and suture ligated with 0 Vicryl on a   CT1 needle.  The uterine artery was skeletonized.  At this time, the uterine   artery on the left and on the right were clamped with Heladio clamp, cut and   suture ligated with 0 Vicryl on a CT1 needle without any problems.  At this   time, the cardinal ligament on the left and then on the right were clamped,   cut and suture ligated with 0 Vicryl on a CT1 needle without any problems. I   amputated the uterus at this level using Bovie cautery and the Angel   scissors and the uterus with the fibroid was handed over to the scrub tech.  I   then went after the cervix.  The cervix was grasped at the internal os on the    posterior and anterior lips with 2 separate Kocher clamps and then at this   time, the uterosacral ligament on left and the one on the right were clamped,   cut and suture ligated with 0 Vicryl on a CT1 needle and then the cervix was   amputated and handed over to the scrub tech.  The vaginal cuff was   approximated with a 2-0 Vicryl on a CT1 needle from left to right.  The pelvis   was vigorously irrigated. The broad ligament around the left ovary had a   small area of bleeding and this was cauterized with the LigaSure and this   became hemostatic.  The pelvis was then vigorously irrigated with water 3   separate times and there was no evidence of any bleeding from anywhere.  At   this time, the vesicouterine peritoneum was then approximated using a 2-0   Vicryl on a CT1 needle from left to right.  The pelvis again was found to be   hemostatic.  Both ovaries were normal; therefore left in place and she had a   normal appendix.  Three moist laps were removed from the pelvis.  The large   Delroy O was removed from the pelvis. The peritoneum was approximated using   2-0 Vicryl on a CT1 needle.  Rectus muscle was irrigated and found to be   hemostatic and then the fascia approximated with 0 Vicryl on a CTX needle from   left to right.  Subcutaneous tissue was irrigated and found to be hemostatic   and approximated with a 2-0 Vicryl on a CT1 needle and the skin was   approximated with a 4-0 Vicryl on a Jak needle.  Mepilex dressing was   placed.  Lap and needle counts were correct x2.  The patient was taken to   recovery room in stable condition.  She did receive clindamycin 900 mg IV   prior to start of surgery.        ______________________________  MD CT FRANCOIS/IVETH    DD:  12/30/2022 14:51  DT:  12/30/2022 15:29    Job#:  [id number]    CC:MD Jerome Staton DO

## 2022-12-30 NOTE — PROGRESS NOTES
1444-Pt arrived from OR. Report received. Pt attached to monitoring. VSS. Pt oxygenating well on 6L mask. Abdominal incision assessed. Incision covered in large mepilex. Pam pad placed. Pad CDI. Heating pack placed over abdomen.    1456 - Pt medicated for pain per MAR. Pt taking sips of water.    1505 - Pt medicated for pain per MAR.    1545 - Pt resting comfortably.    1604 - Pt medicated for pain and nausea per MAR.    1610 - Pt eating cracker.    1622 - Medicated for persistent nausea. Report to Morelia DELACRUZ RN to take over patient care.

## 2022-12-30 NOTE — ANESTHESIA PROCEDURE NOTES
Airway    Date/Time: 12/30/2022 12:54 PM  Performed by: Jerome Marquez D.O.  Authorized by: Jerome Marquez D.O.     Location:  OR  Urgency:  Elective  Difficult Airway: No    Indications for Airway Management:  Anesthesia      Spontaneous Ventilation: absent    Sedation Level:  Deep  Preoxygenated: Yes    Patient Position:  Sniffing  Mask Difficulty Assessment:  1 - vent by mask  Final Airway Type:  Endotracheal airway  Final Endotracheal Airway:  ETT  Cuffed: Yes    Technique Used for Successful ETT Placement:  Direct laryngoscopy    Insertion Site:  Oral  Blade Type:  Portia  Laryngoscope Blade/Videolaryngoscope Blade Size:  3  ETT Size (mm):  7.0  Measured from:  Lips  ETT to Lips (cm):  22  Placement Verified by: auscultation and capnometry    Cormack-Lehane Classification:  Grade IIa - partial view of glottis  Number of Attempts at Approach:  1

## 2022-12-31 VITALS
HEIGHT: 64 IN | SYSTOLIC BLOOD PRESSURE: 107 MMHG | DIASTOLIC BLOOD PRESSURE: 67 MMHG | OXYGEN SATURATION: 100 % | RESPIRATION RATE: 18 BRPM | HEART RATE: 80 BPM | BODY MASS INDEX: 28.27 KG/M2 | TEMPERATURE: 98.6 F | WEIGHT: 165.57 LBS

## 2022-12-31 LAB
ANION GAP SERPL CALC-SCNC: 9 MMOL/L (ref 7–16)
BASOPHILS # BLD AUTO: 0.4 % (ref 0–1.8)
BASOPHILS # BLD: 0.04 K/UL (ref 0–0.12)
BUN SERPL-MCNC: 6 MG/DL (ref 8–22)
CALCIUM SERPL-MCNC: 8.7 MG/DL (ref 8.5–10.5)
CHLORIDE SERPL-SCNC: 104 MMOL/L (ref 96–112)
CO2 SERPL-SCNC: 22 MMOL/L (ref 20–33)
CREAT SERPL-MCNC: 0.59 MG/DL (ref 0.5–1.4)
EOSINOPHIL # BLD AUTO: 0.02 K/UL (ref 0–0.51)
EOSINOPHIL NFR BLD: 0.2 % (ref 0–6.9)
ERYTHROCYTE [DISTWIDTH] IN BLOOD BY AUTOMATED COUNT: 49.3 FL (ref 35.9–50)
GFR SERPLBLD CREATININE-BSD FMLA CKD-EPI: 114 ML/MIN/1.73 M 2
GLUCOSE SERPL-MCNC: 105 MG/DL (ref 65–99)
HCT VFR BLD AUTO: 34.2 % (ref 37–47)
HGB BLD-MCNC: 11.3 G/DL (ref 12–16)
IMM GRANULOCYTES # BLD AUTO: 0.04 K/UL (ref 0–0.11)
IMM GRANULOCYTES NFR BLD AUTO: 0.4 % (ref 0–0.9)
LYMPHOCYTES # BLD AUTO: 1.85 K/UL (ref 1–4.8)
LYMPHOCYTES NFR BLD: 16.8 % (ref 22–41)
MCH RBC QN AUTO: 28.2 PG (ref 27–33)
MCHC RBC AUTO-ENTMCNC: 33 G/DL (ref 33.6–35)
MCV RBC AUTO: 85.3 FL (ref 81.4–97.8)
MONOCYTES # BLD AUTO: 0.64 K/UL (ref 0–0.85)
MONOCYTES NFR BLD AUTO: 5.8 % (ref 0–13.4)
NEUTROPHILS # BLD AUTO: 8.44 K/UL (ref 2–7.15)
NEUTROPHILS NFR BLD: 76.4 % (ref 44–72)
NRBC # BLD AUTO: 0 K/UL
NRBC BLD-RTO: 0 /100 WBC
PLATELET # BLD AUTO: 297 K/UL (ref 164–446)
PMV BLD AUTO: 10.1 FL (ref 9–12.9)
POTASSIUM SERPL-SCNC: 4 MMOL/L (ref 3.6–5.5)
RBC # BLD AUTO: 4.01 M/UL (ref 4.2–5.4)
SODIUM SERPL-SCNC: 135 MMOL/L (ref 135–145)
WBC # BLD AUTO: 11 K/UL (ref 4.8–10.8)

## 2022-12-31 PROCEDURE — 700102 HCHG RX REV CODE 250 W/ 637 OVERRIDE(OP): Performed by: OBSTETRICS & GYNECOLOGY

## 2022-12-31 PROCEDURE — 36415 COLL VENOUS BLD VENIPUNCTURE: CPT

## 2022-12-31 PROCEDURE — 85025 COMPLETE CBC W/AUTO DIFF WBC: CPT

## 2022-12-31 PROCEDURE — 80048 BASIC METABOLIC PNL TOTAL CA: CPT

## 2022-12-31 PROCEDURE — A9270 NON-COVERED ITEM OR SERVICE: HCPCS | Performed by: OBSTETRICS & GYNECOLOGY

## 2022-12-31 RX ADMIN — IBUPROFEN 800 MG: 800 TABLET, FILM COATED ORAL at 06:15

## 2022-12-31 RX ADMIN — ACETAMINOPHEN 1000 MG: 500 TABLET ORAL at 12:06

## 2022-12-31 RX ADMIN — IBUPROFEN 800 MG: 800 TABLET, FILM COATED ORAL at 18:45

## 2022-12-31 RX ADMIN — ACETAMINOPHEN 1000 MG: 500 TABLET ORAL at 18:42

## 2022-12-31 RX ADMIN — OXYCODONE 5 MG: 5 TABLET ORAL at 17:57

## 2022-12-31 RX ADMIN — IBUPROFEN 800 MG: 800 TABLET, FILM COATED ORAL at 12:06

## 2022-12-31 RX ADMIN — DOCUSATE SODIUM 100 MG: 100 CAPSULE, LIQUID FILLED ORAL at 06:15

## 2022-12-31 RX ADMIN — OXYCODONE 5 MG: 5 TABLET ORAL at 10:05

## 2022-12-31 RX ADMIN — OXYCODONE 2.5 MG: 5 TABLET ORAL at 02:17

## 2022-12-31 RX ADMIN — DOCUSATE SODIUM 100 MG: 100 CAPSULE, LIQUID FILLED ORAL at 18:42

## 2022-12-31 RX ADMIN — ACETAMINOPHEN 1000 MG: 500 TABLET ORAL at 04:49

## 2022-12-31 ASSESSMENT — PATIENT HEALTH QUESTIONNAIRE - PHQ9
SUM OF ALL RESPONSES TO PHQ9 QUESTIONS 1 AND 2: 0
1. LITTLE INTEREST OR PLEASURE IN DOING THINGS: NOT AT ALL
2. FEELING DOWN, DEPRESSED, IRRITABLE, OR HOPELESS: NOT AT ALL

## 2022-12-31 ASSESSMENT — PAIN DESCRIPTION - PAIN TYPE
TYPE: SURGICAL PAIN

## 2022-12-31 NOTE — PROGRESS NOTES
Patient arrived to [facility] via gurney. Patient transferred independently to  bed. Patient oriented to room and call light.

## 2022-12-31 NOTE — PROGRESS NOTES
Received report from DEYA Collier. Patient in bed, sleeping but easily aroused and responding to questions. Patient with zhao draining to gravity, SCD's and pulse ox on and working and has oxymask at 10L for 6 hrs. Whiteboards updated, POC discussed. Call light within reach, Daughter at bedside. Encouraged to call with any needs and or concerns.       2200: Oxygen turned off.

## 2022-12-31 NOTE — CARE PLAN
The patient is Stable - Low risk of patient condition declining or worsening    Shift Goals  Clinical Goals: No N/V, pain control    Progress made toward(s) clinical / shift goals:  Patient receiving tylenol and motrin for pain. Offered heat packs and or ice packs but patient declined.     Patient is not progressing towards the following goals: Patient nauseous, has scopolamine patch and received zofran and benadryl. Slowly tolerating sips/ice chips and snacks.

## 2022-12-31 NOTE — PROGRESS NOTES
Progress Note  POD 1 s/p    Exam under anesthesia.  2.  Total abdominal hysterectomy.  3.  Bilateral salpingectomy.    Subjective: Pt tolerating cld, pt has ambulated. Pt is passing flatus. Pt is hungry. Pt with adequate pain control.     Objective Data:  Recent Labs     12/31/22  0620   WBC 11.0*   RBC 4.01*   HEMOGLOBIN 11.3*   HEMATOCRIT 34.2*   MCV 85.3   MCH 28.2   MCHC 33.0*   RDW 49.3   PLATELETCT 297   MPV 10.1     Recent Labs     12/31/22  0620   SODIUM 135   POTASSIUM 4.0   CHLORIDE 104   CO2 22   GLUCOSE 105*   BUN 6*   CREATININE 0.59   CALCIUM 8.7       Vitals:    12/30/22 2234 12/31/22 0203 12/31/22 0546 12/31/22 0553   BP: 127/79 109/79 107/63    Pulse: 68 60 60    Resp: 19 18 18    Temp: 37.3 °C (99.1 °F) (!) 38.1 °C (100.6 °F) (!) 38.2 °C (100.8 °F) 37.1 °C (98.8 °F)   TempSrc: Temporal Temporal Temporal Oral   SpO2: 97% 99% 98%    Weight:       Height:         Gen: NAD  Lungs: CTA B  CV: RRR no murmur  Abd: soft, NTND, positive BS  Wound: c/d/i  Ext: no calf tenderness      Intake/Output Summary (Last 24 hours) at 12/31/2022 0815  Last data filed at 12/31/2022 0546  Gross per 24 hour   Intake 1000 ml   Output 2850 ml   Net -1850 ml       Current Facility-Administered Medications   Medication Dose Route Frequency Provider Last Rate Last Admin    Pharmacy Consult Request ...Pain Management Review 1 Each  1 Each Other PHARMACY TO DOSE Jojo Rodríguez M.D.        ondansetron (ZOFRAN) syringe/vial injection 4 mg  4 mg Intravenous Q4HRS PRN Jojo Rodríguez M.D.   4 mg at 12/30/22 2201    dexamethasone (DECADRON) injection 4 mg  4 mg Intravenous Once PRN Jojo Rodríguez M.D.        diphenhydrAMINE (BENADRYL) injection 25 mg  25 mg Intravenous Q6HRS PRN Jojo Rodríguez M.D.   25 mg at 12/30/22 1830    haloperidol lactate (HALDOL) injection 1 mg  1 mg Intravenous Q6HRS PRN Jojo Rodríguez M.D.        scopolamine (TRANSDERM-SCOP) patch 1 Patch  1 Patch Transdermal Q72HRS PRN Jojo  WHITLEY Rodríguez M.D.   1 Patch at 12/30/22 1830    docusate sodium (COLACE) capsule 100 mg  100 mg Oral BID Jojo Rodríguez M.D.   100 mg at 12/31/22 0615    senna-docusate (PERICOLACE or SENOKOT S) 8.6-50 MG per tablet 1 Tablet  1 Tablet Oral Nightly Jojo Rodríguez M.D.   1 Tablet at 12/30/22 2137    senna-docusate (PERICOLACE or SENOKOT S) 8.6-50 MG per tablet 1 Tablet  1 Tablet Oral Q24HRS NYA Rodríguez M.D.        polyethylene glycol/lytes (MIRALAX) PACKET 1 Packet  1 Packet Oral BID PRMANNY Rodríguez M.D.        magnesium hydroxide (MILK OF MAGNESIA) suspension 30 mL  30 mL Oral QDAY PRMANNY Rodríguez M.D.        bisacodyl (DULCOLAX) suppository 10 mg  10 mg Rectal Q24HRS PRMANNY Rodríguez M.D.        sodium phosphate (Fleet) enema 133 mL  1 Each Rectal Once PRMANNY Rodríguez M.D.        acetaminophen (TYLENOL) tablet 1,000 mg  1,000 mg Oral Q6HRS Jojo Rodríguez M.D.   1,000 mg at 12/31/22 0449    Followed by    [START ON 1/4/2023] acetaminophen (TYLENOL) tablet 1,000 mg  1,000 mg Oral Q6HRS NYA Rodríguez M.D.        ibuprofen (MOTRIN) tablet 800 mg  800 mg Oral TID Jojo Rodríguez M.D.   800 mg at 12/31/22 0615    Followed by    [START ON 1/4/2023] ibuprofen (MOTRIN) tablet 800 mg  800 mg Oral TID NYA Rodríguez M.D.        oxyCODONE immediate-release (ROXICODONE) tablet 2.5 mg  2.5 mg Oral Q3HRS NYA Rodríguez M.D.   2.5 mg at 12/31/22 0217    Or    oxyCODONE immediate-release (ROXICODONE) tablet 5 mg  5 mg Oral Q3HRS PRN Jojo Rodríguez M.D.        Or    HYDROmorphone (Dilaudid) injection 0.25 mg  0.25 mg Intravenous Q3HRS PRN Jojo Rodríguez M.D.           A/P: POD 1   s/p 1.  Exam under anesthesia.  2.  Total abdominal hysterectomy.  3.  Bilateral salpingectomy.  Post op-pt doing well. Pt to ambulate, work on I/S, d/c DEYSI zhao. Possible d/c tomorrow am.

## 2022-12-31 NOTE — PROGRESS NOTES
0648: Dr. Rodríguez notified of patients temps. Orders received for oral temps only. If patient has another temp RN to order COVID/INFLUENZA and UA with culture and sensitivity. Report given to DEYA Ribera.

## 2022-12-31 NOTE — CARE PLAN
The patient is Watcher - Medium risk of patient condition declining or worsening         Progress made toward(s) clinical / shift goals:  Pain well controlled     Patient is not progressing towards the following goals:

## 2022-12-31 NOTE — PROGRESS NOTES
0700-- Received report from DEYA Nance.    Pt resting in bed. Discussed pain management for the day.  No further needs at the time.  Call light within reach, bed locked and in lowest position.  Rounding in place.    0845-- Assessment completed, VSS, Pt declines PRN pain medication at this time.  Discussed plan of care for the day that pt is comfortable with.  All questions answered at this time.  Will continue to monitor.

## 2022-12-31 NOTE — PROGRESS NOTES
1622 - Report from Seth Seth.    1644 - Haloperidol given for nausea.  Pt with stable VS, on 10L O2 via oxymask per [facility] order.     1705 - Report given to postpartum bedside SETH Tanner by SETH Seth.     1718 - Pt taken to room with transport.  Pt still with intermittent bouts of nausea but subside after some deep breathing and relaxing.  VSS, on 10L [facility] for transport. Belongings on [facility].

## 2023-01-01 NOTE — DISCHARGE INSTRUCTIONS
GENERAL POST-OPERATIVE  PATIENT INSTRUCTIONS      FOLLOW-UP:  Please make an appointment with your physician in 2 weeks if you have any fevers greater than 100.4F, drainage from you wound that is not clear or looks infected, persistent bleeding, increasing abdominal pain, problems urinating, or persistent nausea/vomiting.      WOUND CARE INSTRUCTIONS:  Keep a dry clean dressing on the wound if there is drainage. The initial bandage may be removed after 24 hours.  Once the wound has quit draining you may leave it open to air.  If clothing rubs against the wound or causes irritation and the wound is not draining you may cover it with a dry dressing during the daytime.  Try to keep the wound dry and avoid ointments on the wound unless directed to do so.  If the wound becomes bright red and painful or starts to drain infected material that is not clear, please contact your physician immediately.  If the wound is mildly pink and has a thick firm ridge underneath it, this is normal, and is referred to as a healing ridge.  This will resolve over the next 4-6 weeks.    DIET:  You may eat any foods that you can tolerate.  It is a good idea to eat a high fiber diet and take in plenty of fluids to prevent constipation.  If you do become constipated you may want to take a mild laxative or take ducolax tablets on a daily basis until your bowel habits are regular.  Constipation can be very uncomfortable, along with straining, after recent surgery.    ACTIVITY:  You are encouraged to cough and deep breath or use your incentive spirometer if you were given one, every 15-30 minutes when awake.  This will help prevent respiratory complications and low grade fevers post-operatively if you had a general anesthetic.  You may want to hug a pillow when coughing and sneezing to add additional support to the surgical area, if you had abdominal or chest surgery, which will decrease pain during these times.  You are encouraged to walk and  engage in light activity for the next two weeks.  You should not lift more than 20 pounds during this time frame as it could put you at increased risk for complications.  Twenty pounds is roughly equivalent to a plastic bag of groceries.      MEDICATIONS:  Try to take narcotic medications and anti-inflammatory medications, such as tylenol, ibuprofen, naprosyn, etc., with food.  This will minimize stomach upset from the medication.  Should you develop nausea and vomiting from the pain medication, or develop a rash, please discontinue the medication and contact your physician.  You should not drive, make important decisions, or operate machinery when taking narcotic pain medication.    QUESTIONS:  Please feel free to call your physician or the hospital  if you have any questions, and they will be glad to assist you.

## 2023-01-01 NOTE — PROGRESS NOTES
1845- Discharge teaching reviewed with pt, all questions answered.  Pt has all written information on self care, including prescription information, and follow-up instructions.    1920- Pt discharged home in stable condition with SO.

## 2023-01-01 NOTE — CARE PLAN
The patient is Stable - Low risk of patient condition declining or worsening    Shift Goals  Clinical Goals: Pt pain will be well controlled.    Progress made toward(s) clinical / shift goals:  Pt pain controlled. Pt discharged home    Patient is not progressing towards the following goals: N/A

## 2023-06-07 ENCOUNTER — HOSPITAL ENCOUNTER (OUTPATIENT)
Dept: LAB | Facility: MEDICAL CENTER | Age: 43
End: 2023-06-07
Attending: PHYSICIAN ASSISTANT
Payer: COMMERCIAL

## 2023-06-07 ENCOUNTER — OFFICE VISIT (OUTPATIENT)
Dept: MEDICAL GROUP | Facility: PHYSICIAN GROUP | Age: 43
End: 2023-06-07
Payer: COMMERCIAL

## 2023-06-07 VITALS
OXYGEN SATURATION: 99 % | HEIGHT: 64 IN | TEMPERATURE: 97.8 F | WEIGHT: 173 LBS | SYSTOLIC BLOOD PRESSURE: 106 MMHG | BODY MASS INDEX: 29.53 KG/M2 | RESPIRATION RATE: 16 BRPM | DIASTOLIC BLOOD PRESSURE: 80 MMHG | HEART RATE: 70 BPM

## 2023-06-07 DIAGNOSIS — E78.2 MIXED HYPERLIPIDEMIA: Chronic | ICD-10-CM

## 2023-06-07 DIAGNOSIS — Z13.0 SCREENING FOR ENDOCRINE, METABOLIC AND IMMUNITY DISORDER: ICD-10-CM

## 2023-06-07 DIAGNOSIS — Z23 NEED FOR VACCINATION: ICD-10-CM

## 2023-06-07 DIAGNOSIS — Z13.29 SCREENING FOR ENDOCRINE, METABOLIC AND IMMUNITY DISORDER: ICD-10-CM

## 2023-06-07 DIAGNOSIS — Z11.59 NEED FOR HEPATITIS C SCREENING TEST: ICD-10-CM

## 2023-06-07 DIAGNOSIS — L65.9 HAIR LOSS: ICD-10-CM

## 2023-06-07 DIAGNOSIS — Z13.228 SCREENING FOR ENDOCRINE, METABOLIC AND IMMUNITY DISORDER: ICD-10-CM

## 2023-06-07 DIAGNOSIS — E61.1 IRON DEFICIENCY: ICD-10-CM

## 2023-06-07 DIAGNOSIS — R74.8 ELEVATED LIVER ENZYMES: ICD-10-CM

## 2023-06-07 LAB
ALBUMIN SERPL BCP-MCNC: 4.5 G/DL (ref 3.2–4.9)
ALBUMIN/GLOB SERPL: 1.6 G/DL
ALP SERPL-CCNC: 70 U/L (ref 30–99)
ALT SERPL-CCNC: 34 U/L (ref 2–50)
ANION GAP SERPL CALC-SCNC: 13 MMOL/L (ref 7–16)
AST SERPL-CCNC: 21 U/L (ref 12–45)
BILIRUB SERPL-MCNC: 0.4 MG/DL (ref 0.1–1.5)
BUN SERPL-MCNC: 12 MG/DL (ref 8–22)
CALCIUM ALBUM COR SERPL-MCNC: 9.1 MG/DL (ref 8.5–10.5)
CALCIUM SERPL-MCNC: 9.5 MG/DL (ref 8.5–10.5)
CHLORIDE SERPL-SCNC: 104 MMOL/L (ref 96–112)
CHOLEST SERPL-MCNC: 238 MG/DL (ref 100–199)
CO2 SERPL-SCNC: 24 MMOL/L (ref 20–33)
CREAT SERPL-MCNC: 0.68 MG/DL (ref 0.5–1.4)
ERYTHROCYTE [DISTWIDTH] IN BLOOD BY AUTOMATED COUNT: 46.5 FL (ref 35.9–50)
EST. AVERAGE GLUCOSE BLD GHB EST-MCNC: 108 MG/DL
FASTING STATUS PATIENT QL REPORTED: NORMAL
GFR SERPLBLD CREATININE-BSD FMLA CKD-EPI: 110 ML/MIN/1.73 M 2
GLOBULIN SER CALC-MCNC: 2.8 G/DL (ref 1.9–3.5)
GLUCOSE SERPL-MCNC: 93 MG/DL (ref 65–99)
HBA1C MFR BLD: 5.4 % (ref 4–5.6)
HCT VFR BLD AUTO: 43.7 % (ref 37–47)
HDLC SERPL-MCNC: 64 MG/DL
HGB BLD-MCNC: 14.7 G/DL (ref 12–16)
IRON SATN MFR SERPL: 32 % (ref 15–55)
IRON SERPL-MCNC: 123 UG/DL (ref 40–170)
LDLC SERPL CALC-MCNC: 141 MG/DL
MAGNESIUM SERPL-MCNC: 2 MG/DL (ref 1.5–2.5)
MCH RBC QN AUTO: 31.7 PG (ref 27–33)
MCHC RBC AUTO-ENTMCNC: 33.6 G/DL (ref 32.2–35.5)
MCV RBC AUTO: 94.2 FL (ref 81.4–97.8)
PLATELET # BLD AUTO: 322 K/UL (ref 164–446)
PMV BLD AUTO: 10.5 FL (ref 9–12.9)
POTASSIUM SERPL-SCNC: 4.3 MMOL/L (ref 3.6–5.5)
PROT SERPL-MCNC: 7.3 G/DL (ref 6–8.2)
RBC # BLD AUTO: 4.64 M/UL (ref 4.2–5.4)
SODIUM SERPL-SCNC: 141 MMOL/L (ref 135–145)
TIBC SERPL-MCNC: 387 UG/DL (ref 250–450)
TRIGL SERPL-MCNC: 167 MG/DL (ref 0–149)
UIBC SERPL-MCNC: 264 UG/DL (ref 110–370)
WBC # BLD AUTO: 9.5 K/UL (ref 4.8–10.8)

## 2023-06-07 PROCEDURE — 86803 HEPATITIS C AB TEST: CPT

## 2023-06-07 PROCEDURE — 90715 TDAP VACCINE 7 YRS/> IM: CPT | Performed by: PHYSICIAN ASSISTANT

## 2023-06-07 PROCEDURE — 99214 OFFICE O/P EST MOD 30 MIN: CPT | Mod: 25 | Performed by: PHYSICIAN ASSISTANT

## 2023-06-07 PROCEDURE — 83036 HEMOGLOBIN GLYCOSYLATED A1C: CPT

## 2023-06-07 PROCEDURE — 90471 IMMUNIZATION ADMIN: CPT | Performed by: PHYSICIAN ASSISTANT

## 2023-06-07 PROCEDURE — 82607 VITAMIN B-12: CPT

## 2023-06-07 PROCEDURE — 90746 HEPB VACCINE 3 DOSE ADULT IM: CPT | Performed by: PHYSICIAN ASSISTANT

## 2023-06-07 PROCEDURE — 80053 COMPREHEN METABOLIC PANEL: CPT

## 2023-06-07 PROCEDURE — 3074F SYST BP LT 130 MM HG: CPT | Performed by: PHYSICIAN ASSISTANT

## 2023-06-07 PROCEDURE — 83540 ASSAY OF IRON: CPT

## 2023-06-07 PROCEDURE — 80061 LIPID PANEL: CPT

## 2023-06-07 PROCEDURE — 90472 IMMUNIZATION ADMIN EACH ADD: CPT | Performed by: PHYSICIAN ASSISTANT

## 2023-06-07 PROCEDURE — 36415 COLL VENOUS BLD VENIPUNCTURE: CPT

## 2023-06-07 PROCEDURE — 3079F DIAST BP 80-89 MM HG: CPT | Performed by: PHYSICIAN ASSISTANT

## 2023-06-07 PROCEDURE — 82746 ASSAY OF FOLIC ACID SERUM: CPT

## 2023-06-07 PROCEDURE — 83550 IRON BINDING TEST: CPT

## 2023-06-07 PROCEDURE — 85027 COMPLETE CBC AUTOMATED: CPT

## 2023-06-07 PROCEDURE — 84443 ASSAY THYROID STIM HORMONE: CPT

## 2023-06-07 PROCEDURE — 82306 VITAMIN D 25 HYDROXY: CPT

## 2023-06-07 PROCEDURE — 82728 ASSAY OF FERRITIN: CPT

## 2023-06-07 PROCEDURE — 83735 ASSAY OF MAGNESIUM: CPT

## 2023-06-07 ASSESSMENT — FIBROSIS 4 INDEX: FIB4 SCORE: 0.85

## 2023-06-07 ASSESSMENT — PATIENT HEALTH QUESTIONNAIRE - PHQ9: CLINICAL INTERPRETATION OF PHQ2 SCORE: 0

## 2023-06-07 NOTE — PROGRESS NOTES
"Subjective:     CC: Establish care, hyperlipidemia    HPI:   Malorie Rasmussen presents today to establish care and is requesting lab work.    Patient has a history of a fatty liver and was previously on pravastatin for management of her hyperlipidemia but never started this medication.     Pt would also like hormone testing. Had a hysterectomy and has been dealing with hair loss since then.    Past Medical History:   Diagnosis Date    Chronic daily headache     Dyslipidemia     Elevated liver enzymes        Social History     Tobacco Use    Smoking status: Never    Smokeless tobacco: Never   Vaping Use    Vaping Use: Never used   Substance Use Topics    Alcohol use: Yes     Alcohol/week: 0.6 oz     Types: 1 Standard drinks or equivalent per week     Comment: Socially, 3-4 drinks a month    Drug use: No       No current Epic-ordered outpatient medications on file.     No current Epic-ordered facility-administered medications on file.       Allergies:  Omnicef    Health Maintenance: Completed    ROS:  Gen: no fevers/chills  Eyes: no changes in vision  ENT: no sore throat  Pulm: no sob, no cough  CV: no chest pain  GI: no nausea/vomiting  : no dysuria  MSk: no myalgias  Skin: no rash  Neuro: no headaches  Psych: no depression, no anxiety      Objective:       Exam:  /80   Pulse 70   Temp 36.6 °C (97.8 °F) (Temporal)   Resp 16   Ht 1.626 m (5' 4\")   Wt 78.5 kg (173 lb)   LMP 07/17/2019   SpO2 99%   Breastfeeding No   BMI 29.70 kg/m²  Body mass index is 29.7 kg/m².    Constitutional: Alert, no distress, well-groomed.  Skin: Warm, dry, good turgor, no rashes in visible areas.  Eye: Equal, round and reactive, conjunctiva clear, lids normal.  ENMT: Lips without lesions, good dentition, moist mucous membranes.  Neck: Trachea midline, no masses, no thyromegaly.  Respiratory: Unlabored respiratory effort, no cough.  MSK: Normal gait, moves all extremities.  Neuro: Grossly non-focal.   Psych: Alert and oriented " x3, normal affect and mood.    Labs: Labs from 8/22/2022 were reviewed.     Assessment & Plan:     43 y.o. female with the following -     1. Iron deficiency  Chronic, status unknown.  Patient has a history of uterine fibroid and is status post hysterectomy 6 months ago.  Labs ordered to further evaluate.  Patient is currently supplementing with iron on some days.  - CBC WITHOUT DIFFERENTIAL; Future  - IRON/TOTAL IRON BIND; Future  - FERRITIN; Future    2. Hair loss  Chronic, worsening.  Labs ordered.  Recommended avoiding heat on her hair, coloring her hair, wearing her to take ponytail, etc. Patient can trial 6 to 12 months of biotin supplementation to see if that helps as well.  - TSH WITH REFLEX TO FT4; Future  - VITAMIN B12; Future  - FOLATE; Future  - MAGNESIUM; Future    3. Elevated liver enzymes  Chronic, status unknown.  Labs ordered today.  Patient does have a history of fatty liver confirmed on imaging.  Has been working on lifestyle modification.  - Comp Metabolic Panel; Future    4. Mixed hyperlipidemia  Chronic, status unknown.  Labs ordered.  Discussed patient's ASCVD risk of less than 5% on blood work last year so no medication needed at this time.  Recommended that the patient improve lifestyle with less saturated fats and fried foods as well as regular exercise.      - Lipid Profile; Future    5. Need for hepatitis C screening test  - HEP C VIRUS ANTIBODY; Future    6. Screening for endocrine, metabolic and immunity disorder  - VITAMIN D,25 HYDROXY (DEFICIENCY); Future  - HEMOGLOBIN A1C; Future    7. Need for vaccination  - Hepatitis B Vaccine Adult 20+  - Tdap Vaccine =>8YO IM    I spent a total of 30 minutes with record review (including external notes and labs), exam, communication with the patient, communication with other providers, and documentation of this encounter.     Return for follow up labs.    Please note that this dictation was created using voice recognition software. I have made  every reasonable attempt to correct obvious errors, but I expect that there are errors of grammar and possibly content that I did not discover before finalizing the note.    Electronically signed by Kaylyn Watson PA-C on June 7, 2023

## 2023-06-08 LAB
25(OH)D3 SERPL-MCNC: 25 NG/ML (ref 30–100)
FERRITIN SERPL-MCNC: 61 NG/ML (ref 10–291)
FOLATE SERPL-MCNC: 5.8 NG/ML
HCV AB SER QL: NORMAL
TSH SERPL DL<=0.005 MIU/L-ACNC: 1.03 UIU/ML (ref 0.38–5.33)
VIT B12 SERPL-MCNC: 343 PG/ML (ref 211–911)

## 2023-11-30 ENCOUNTER — OFFICE VISIT (OUTPATIENT)
Dept: MEDICAL GROUP | Facility: PHYSICIAN GROUP | Age: 43
End: 2023-11-30
Payer: COMMERCIAL

## 2023-11-30 VITALS
OXYGEN SATURATION: 98 % | RESPIRATION RATE: 20 BRPM | DIASTOLIC BLOOD PRESSURE: 72 MMHG | HEART RATE: 82 BPM | TEMPERATURE: 98.2 F | WEIGHT: 183 LBS | SYSTOLIC BLOOD PRESSURE: 120 MMHG | BODY MASS INDEX: 31.24 KG/M2 | HEIGHT: 64 IN

## 2023-11-30 DIAGNOSIS — R19.7 ACUTE DIARRHEA: ICD-10-CM

## 2023-11-30 LAB
FLUAV RNA SPEC QL NAA+PROBE: NEGATIVE
FLUBV RNA SPEC QL NAA+PROBE: NEGATIVE
RSV RNA SPEC QL NAA+PROBE: NEGATIVE
SARS-COV-2 RNA RESP QL NAA+PROBE: NEGATIVE

## 2023-11-30 PROCEDURE — 3074F SYST BP LT 130 MM HG: CPT | Performed by: NURSE PRACTITIONER

## 2023-11-30 PROCEDURE — 3078F DIAST BP <80 MM HG: CPT | Performed by: NURSE PRACTITIONER

## 2023-11-30 PROCEDURE — 0241U POCT CEPHEID COV-2, FLU A/B, RSV - PCR: CPT | Performed by: NURSE PRACTITIONER

## 2023-11-30 PROCEDURE — 99213 OFFICE O/P EST LOW 20 MIN: CPT | Performed by: NURSE PRACTITIONER

## 2023-11-30 ASSESSMENT — FIBROSIS 4 INDEX: FIB4 SCORE: 0.49

## 2023-11-30 NOTE — LETTER
November 30, 2023    To Whom It May Concern:         This is confirmation that Malorie Katz attended her scheduled appointment with Nelida Humphrey D.N.P. on 11/30/23. Please excuse her from any missed days at work. She may return to work by 12/2/2023 if symptoms have improved.          If you have any questions please do not hesitate to call me at the phone number listed below.    Sincerely,          ERYN TellezP.  [phone]

## 2023-11-30 NOTE — PROGRESS NOTES
"Chief Complaint   Patient presents with    Nausea/Vomiting/Diarrhea     Started Tuesday, worse last night, vomited once, diarrhea, no appetite, unable to keep fluids down, in restroom since 3  in the morning, kaopectate for nausea, no fever        HISTORY OF PRESENT ILLNESS: Malorie Katz is a 43 y.o. female established patient of [facility] who presents today to discuss:  - reports having mild abdominal cramps on Tuesday, 11/28/2023. Last night she had diarrhea with vomiting. States has multiple episodes of diarrhea since then. No blood in her stool, no nausea, no fever, no dizziness. States abdominal discomfort is generalized all over.   - she had some crackers and pedialyte today. She tried kaopectate with not much relief.   - hx uterine fibroid, s/p hysterectomy in 2022    No current outpatient medications on file prior to visit.     No current facility-administered medications on file prior to visit.       has a past medical history of Chronic daily headache, Dyslipidemia, and Elevated liver enzymes.     Patient Active Problem List   Diagnosis    Chronic daily headache    Mixed hyperlipidemia    Elevated liver enzymes    Vitamin d deficiency    RUQ pain    Anxiety and depression    Stomach pain    Uterine fibroid    Iron deficiency    History of abdominal hysterectomy        Allergies:[email or web address]    Health Maintenance: deferred  Review of Systems -included above  Exam:   /72   Pulse 82   Temp 36.8 °C (98.2 °F) (Temporal)   Resp 20   Ht 1.626 m (5' 4\")   Wt 83 kg (183 lb)   SpO2 98%   Body mass index is 31.41 kg/m².   Physical Exam  Constitutional:       Appearance: Normal appearance.   Cardiovascular:      Rate and Rhythm: Normal rate and regular rhythm.      Pulses: Normal pulses.      Heart sounds: Normal heart sounds.   Abdominal:      General: Bowel sounds are normal. There is no distension.      Palpations: Abdomen is soft.      Tenderness: There is generalized abdominal tenderness. " There is no right CVA tenderness, left CVA tenderness, guarding or rebound.      Hernia: No hernia is present.   Skin:     General: Skin is warm and dry.   Neurological:      General: No focal deficit present.      Mental Status: She is alert and oriented to person, place, and time.        Latest Reference Range & Units 11/30/23 15:28   Influenza virus A RNA Negative, Invalid  Negative   Influenza virus B, PCR Negative, Invalid  Negative   RSV, PCR Negative, Invalid  Negative   SARS-CoV-2 by PCR Negative, Invalid  Negative       Assessment/Plan:  1. Acute diarrhea  44 y/o F with hx hyperlipidemia, anxiety here with generalized abdominal pain x 2 days and then diarrhea since last night; there is noted history of chronic RUQ pain in the past, was seen by GI; noted to have fatty liver. Stools are non-bloody, no nausea, no fever. Vomited once last night; tolerated fluids and crackers today. She does not recall eating any new food or drinks the days prior to symptoms. Abdomen is soft with generalized discomfort, no point tenderness or guarding. R/o viral vs bacterial gastritis (least likely since no fever or bloody stools). COVID/FLU test is negative today.  Abdominal xray, stool studies ordered and pending. Start prn imodium for now. Recommend she present to ER for IV hydration if not tolerating food/fluids. She has no nausea or vomiting today.     - [id number] VIEWS; Future  - Complete O&P; Future  - POCT CoV-2, Flu A/B, RSV by PCR     Follow up:  Return if symptoms worsen or fail to improve.    Educated in proper administration of medication(s) ordered today including safety, possible SE, risks, benefits, rationale and alternatives to therapy.       Please note that this dictation was created using voice recognition software. I have made every reasonable attempt to correct obvious errors, but I expect that there are errors of grammar and possibly content that I did not discover before finalizing the note.

## 2025-05-30 ENCOUNTER — HOSPITAL ENCOUNTER (OUTPATIENT)
Dept: LAB | Facility: MEDICAL CENTER | Age: 45
End: 2025-05-30
Attending: FAMILY MEDICINE
Payer: COMMERCIAL

## 2025-05-30 LAB
ALBUMIN SERPL BCP-MCNC: 4.3 G/DL (ref 3.2–4.9)
ALBUMIN/GLOB SERPL: 1.4 G/DL
ALP SERPL-CCNC: 106 U/L (ref 30–99)
ALT SERPL-CCNC: 172 U/L (ref 2–50)
ANION GAP SERPL CALC-SCNC: 13 MMOL/L (ref 7–16)
AST SERPL-CCNC: 94 U/L (ref 12–45)
BILIRUB SERPL-MCNC: 0.5 MG/DL (ref 0.1–1.5)
BUN SERPL-MCNC: 11 MG/DL (ref 8–22)
CALCIUM ALBUM COR SERPL-MCNC: 8.8 MG/DL (ref 8.5–10.5)
CALCIUM SERPL-MCNC: 9 MG/DL (ref 8.5–10.5)
CHLORIDE SERPL-SCNC: 102 MMOL/L (ref 96–112)
CO2 SERPL-SCNC: 23 MMOL/L (ref 20–33)
CORTIS SERPL-MCNC: 12.3 UG/DL (ref 0–23)
CREAT SERPL-MCNC: 0.75 MG/DL (ref 0.5–1.4)
EST. AVERAGE GLUCOSE BLD GHB EST-MCNC: 123 MG/DL
GFR SERPLBLD CREATININE-BSD FMLA CKD-EPI: 99 ML/MIN/1.73 M 2
GLOBULIN SER CALC-MCNC: 3 G/DL (ref 1.9–3.5)
GLUCOSE SERPL-MCNC: 102 MG/DL (ref 65–99)
HBA1C MFR BLD: 5.9 % (ref 4–5.6)
POTASSIUM SERPL-SCNC: 4 MMOL/L (ref 3.6–5.5)
PROT SERPL-MCNC: 7.3 G/DL (ref 6–8.2)
SODIUM SERPL-SCNC: 138 MMOL/L (ref 135–145)
T4 FREE SERPL-MCNC: 0.94 NG/DL (ref 0.93–1.7)
TSH SERPL-ACNC: 1.16 UIU/ML (ref 0.38–5.33)
VIT B12 SERPL-MCNC: 496 PG/ML (ref 211–911)

## 2025-05-30 PROCEDURE — 36415 COLL VENOUS BLD VENIPUNCTURE: CPT

## 2025-05-30 PROCEDURE — 82607 VITAMIN B-12: CPT

## 2025-05-30 PROCEDURE — 84439 ASSAY OF FREE THYROXINE: CPT

## 2025-05-30 PROCEDURE — 82533 TOTAL CORTISOL: CPT

## 2025-05-30 PROCEDURE — 80053 COMPREHEN METABOLIC PANEL: CPT

## 2025-05-30 PROCEDURE — 83036 HEMOGLOBIN GLYCOSYLATED A1C: CPT

## 2025-05-30 PROCEDURE — 84443 ASSAY THYROID STIM HORMONE: CPT

## (undated) DEVICE — RETRACTOR O C SECTION LRY - (5/BX)

## (undated) DEVICE — CANISTER SUCTION 3000ML MECHANICAL FILTER AUTO SHUTOFF MEDI-VAC NONSTERILE LF DISP  (40EA/CA)

## (undated) DEVICE — SLEEVE VASO CALF MED - (10PR/CA)

## (undated) DEVICE — PAD SANITARY 11IN MAXI IND WRAPPED  (12EA/PK 24PK/CA)

## (undated) DEVICE — Device

## (undated) DEVICE — LACTATED RINGERS INJ 1000 ML - (14EA/CA 60CA/PF)

## (undated) DEVICE — SUTURE 0 VICRYL PLUS CT-1 - 36 INCH (36/BX)

## (undated) DEVICE — SET LEADWIRE 5 LEAD BEDSIDE DISPOSABLE ECG (1SET OF 5/EA)

## (undated) DEVICE — PACK MAJOR BASIN - (2EA/CA)

## (undated) DEVICE — BOVIE NEEDLE TIP INSULATD NON-SAFETY 2CM (50/PK)

## (undated) DEVICE — CANISTER SUCTION RIGID RED 1500CC (40EA/CA)

## (undated) DEVICE — DRESSING POST OP BORDER 4 X 10 (5EA/BX)

## (undated) DEVICE — GOWN WARMING STANDARD FLEX - (30/CA)

## (undated) DEVICE — TUBING CLEARLINK DUO-VENT - C-FLO (48EA/CA)

## (undated) DEVICE — TRAY CATHETER FOLEY URINE METER W/STATLOCK 350ML (10EA/CA)

## (undated) DEVICE — CANNULA W/ SUPPLY TUBING O2 - (50/CA)

## (undated) DEVICE — WATER IRRIGATION STERILE 1000ML (12EA/CA)

## (undated) DEVICE — SUTURE 3-0 VICRYL PLUS CT-1 - 36 INCH (36/BX)

## (undated) DEVICE — SUTURE GENERAL

## (undated) DEVICE — MASK OXYGEN VNYL ADLT MED CONC WITH 7 FOOT TUBING  - (50EA/CA)

## (undated) DEVICE — KIT  I.V. START (100EA/CA)

## (undated) DEVICE — SUCTION INSTRUMENT YANKAUER BULBOUS TIP W/O VENT (50EA/CA)

## (undated) DEVICE — SODIUM CHL IRRIGATION 0.9% 1000ML (12EA/CA)

## (undated) DEVICE — HEMOSTAT ABSORBABLE POWDER SURGICEL 3G (5EA/BX)

## (undated) DEVICE — LIGASURE TISSUE FUSION  - SINGLE USE (6/CA)

## (undated) DEVICE — SUTURE 4-0 27IN VCRL PLUS ANTI (36PK/BX)

## (undated) DEVICE — SUTURE 0 COATED VICRYL 6-18IN - (12PK/BX)

## (undated) DEVICE — TUBE CONNECTING SUCTION - CLEAR PLASTIC STERILE 72 IN (50EA/CA)

## (undated) DEVICE — SENSOR OXIMETER ADULT SPO2 RD SET (20EA/BX)

## (undated) DEVICE — GLOVE BIOGEL SZ 6.5 SURGICAL PF LTX (50PR/BX 4BX/CA)

## (undated) DEVICE — TOWEL STOP TIMEOUT SAFETY FLAG (40EA/CA)

## (undated) DEVICE — SPONGE XRAY 8X4 STERL. 12PL - (10EA/TY 80TY/CA)

## (undated) DEVICE — GLOVE SZ 7.5 BIOGEL PI MICRO - PF LF (50PR/BX)

## (undated) DEVICE — SUTURE 2-0 VICRYL PLUS CT-1 36 (36PK/BX)"

## (undated) DEVICE — TUBE SHILEY ENDOTRACHEAL ORAL RAE CUFFED 7.0MM WITH TAPERGUARD (10EA/PK)